# Patient Record
Sex: FEMALE | Race: WHITE | Employment: OTHER | URBAN - METROPOLITAN AREA
[De-identification: names, ages, dates, MRNs, and addresses within clinical notes are randomized per-mention and may not be internally consistent; named-entity substitution may affect disease eponyms.]

---

## 2017-12-27 ENCOUNTER — HOSPITAL ENCOUNTER (OUTPATIENT)
Dept: GENERAL RADIOLOGY | Age: 77
Discharge: HOME | End: 2017-12-27
Attending: PSYCHIATRY & NEUROLOGY | Admitting: PSYCHIATRY & NEUROLOGY

## 2017-12-27 LAB
ANTI-THYROGLOB ABS: <10 IU/ML
HOMOCYSTEINE: 10 UMOL/L (ref 0–10)

## 2017-12-28 LAB — THYROID PEROXIDASE (TPO) ABS: 11 IU/ML

## 2018-05-07 RX ORDER — DONEPEZIL HYDROCHLORIDE 10 MG/1
TABLET, FILM COATED ORAL
Qty: 30 TABLET | Refills: 5 | OUTPATIENT
Start: 2018-05-07

## 2019-04-01 ENCOUNTER — PROCEDURE VISIT (OUTPATIENT)
Dept: SURGERY | Age: 79
End: 2019-04-01
Payer: COMMERCIAL

## 2019-04-01 VITALS
WEIGHT: 147 LBS | OXYGEN SATURATION: 98 % | DIASTOLIC BLOOD PRESSURE: 46 MMHG | HEART RATE: 65 BPM | SYSTOLIC BLOOD PRESSURE: 86 MMHG | TEMPERATURE: 97.4 F

## 2019-04-01 DIAGNOSIS — C44.729 SQUAMOUS CELL CARCINOMA OF LEFT LOWER LEG: Primary | ICD-10-CM

## 2019-04-01 PROCEDURE — 17313 MOHS 1 STAGE T/A/L: CPT | Performed by: DERMATOLOGY

## 2019-04-01 PROCEDURE — 12032 INTMD RPR S/A/T/EXT 2.6-7.5: CPT | Performed by: DERMATOLOGY

## 2019-04-01 RX ORDER — LEVOTHYROXINE SODIUM 0.03 MG/1
50 TABLET ORAL DAILY
Refills: 4 | COMMUNITY
Start: 2019-03-09

## 2019-04-01 RX ORDER — MONTELUKAST SODIUM 10 MG/1
TABLET ORAL
Refills: 1 | COMMUNITY
Start: 2019-02-20

## 2019-04-01 RX ORDER — POLYETHYLENE GLYCOL 3350 17 G/17G
17 POWDER, FOR SOLUTION ORAL
COMMUNITY
Start: 2013-10-13 | End: 2020-05-10

## 2019-04-01 RX ORDER — ALBUTEROL SULFATE 90 UG/1
AEROSOL, METERED RESPIRATORY (INHALATION)
COMMUNITY
Start: 2013-07-09 | End: 2020-05-10

## 2019-04-01 RX ORDER — OMEPRAZOLE 20 MG/1
20 CAPSULE, DELAYED RELEASE ORAL
Status: ON HOLD | COMMUNITY
End: 2022-07-27 | Stop reason: HOSPADM

## 2019-04-01 RX ORDER — ALENDRONATE SODIUM 35 MG/1
TABLET ORAL
Refills: 5 | COMMUNITY
Start: 2019-03-22

## 2019-04-01 RX ORDER — ALBUTEROL SULFATE 90 UG/1
2 AEROSOL, METERED RESPIRATORY (INHALATION)
COMMUNITY
End: 2020-05-10

## 2019-04-01 RX ORDER — CYCLOSPORINE 0.5 MG/ML
EMULSION OPHTHALMIC
Refills: 0 | Status: ON HOLD | COMMUNITY
Start: 2018-12-27 | End: 2022-07-27 | Stop reason: HOSPADM

## 2019-04-01 RX ORDER — DONEPEZIL HYDROCHLORIDE 10 MG/1
TABLET, FILM COATED ORAL
COMMUNITY
Start: 2019-03-30 | End: 2020-05-10

## 2019-04-01 RX ORDER — ACETAMINOPHEN 160 MG
TABLET,DISINTEGRATING ORAL
COMMUNITY
End: 2020-05-10 | Stop reason: ALTCHOICE

## 2019-04-01 RX ORDER — TIMOLOL MALEATE 5 MG/ML
SOLUTION/ DROPS OPHTHALMIC
Refills: 5 | COMMUNITY
Start: 2019-03-24 | End: 2020-05-10

## 2019-04-01 NOTE — PATIENT INSTRUCTIONS
[facility] Office Hours:    Monday-Thursday  7:30 AM-4:30 PM    Friday  9:00 AM-3:00 PM          POST-OPERATIVE CARE FOR STICHES              Bandage change after 48 hours    CARING FOR YOUR SURGICAL SITE  The bandage should remain on and completley dry for 48 hours. Do NOT get the bandage wet. 1. After the first 48 hours, gently remove the remaining part of the bandage. It can be helpful to moisten the bandage edges in the shower. Steri strips may still be on the wound. It is ok, they will fall off slowly with the daily bandage changes. 2. Gently clean the wound daily with mild soap and water. Try to clean off crust and debris. 3. Dry (pat) the area with a clean Q-tip or gauze. 4. Apply a layer of Vaseline/ Aquaphor (or Bacitracin if your doctor recommends) to the wound area only. 5. Cut a piece of Telfa (or any non-stick dressing) to fit just over the wound and secure it with paper tape. If the wound is small you may use a Band- Aid. Keep area covered for a total of 2 week(s). If the dressing comes off or if you have questions, or concerns about the dressing, please call the office for instructions! POST OPERATIVE INSTRUCTIONS    1. Activity: Do not lift anything heavier than a gallon of milk for 1 week. Also, avoid strenuous activity such as running, power walking or contact sports. 2. Eating and drinking: Do not drink alcohol for 48 hours after your procedure. Alcohol increases the chances of bleeding. 3. Medicines   -If you have discomfort, take Acetaminophen (Tylenol or Extra Strength Tylenol). Follow the instructions and warning on the bottle. -If your doctor has prescribed you an Aspirin daily, please keep taking it. Do not take extra Aspirin or medicines containing Aspirin (such as [place] and Excedrin) for 48 hours  after your procedure. Bleeding: If bleeding occurs, DO NOT remove the bandage.  Put firm pressure on the area with gauze for 20 minutes without peeking. If the bleeding continue, apply pressure for another 20 minutes. If the bleeding does not stop after you apply pressure, call us right away. If you can not call, go to the nearest emergency room or urgent care facility. What to expect:  You may have these symptoms. They are normal and should get better with time:  1. Swelling. Swelling usually increases for the first 48 hours after your procedure and then begins to improve. Some soreness and redness around your wound. If we worked close to you eyes  (forehead, nose, temple, or upper cheeks) your eyes may become swollen and/ or black and blue. 2. Bruising, which could last 1 week or more. 3. Pink and bumpy appearance to the scar. This may happen a few weeks after your procedure. After 4 weeks, you may gently massage the area each day with facial moisturizer or petroleum jelly (Vaseline or Aquaphor). This will help to smooth the skin and improve the appearance of the scar. The color of your scar will fade over time, but may be pink for several months after the procedure. The scar may take 6 months to 1 year to reach its final color and appearance. 4. \"Spitting\" suture. Occasionally, an inside suture (stitch) does not completely dissolve. When this happens, (generally 4-8 weeks after surgery), it causes a bump or \"pimple\" to form on the scar. This is easily removed and is not at all serious. It does not mean the skin cancer has returned. Contact us if it happens, but do not be alarmed. Vitamin E oil is NOT necessary. A good moisturizer is just as effective. Sunscreen IS necessary.  Use at least and SPF 30 sunscreen daily- even in winter    Call us at [phone] right away if you have any of the following symptoms:  -Bleeding that you can not stop (see highlighted area above)   -Pain that lasts longer than 48 hours  -Your wound becomes  more painful, red or hot  -Bruising and swelling that does not begin to improve within the 48 hours or gets

## 2019-04-01 NOTE — PROGRESS NOTES
MOHS PROCEDURE NOTE    PHYSICIAN:  Kathy Cano. Shorty Chávez MD    ASSISTANT: Tala De Oliveira RN and Yasir Ball RN     REFERRING PROVIDER:  Demetrice Agrawal MD    PREOPERATIVE DIAGNOSIS: Superficial squamous cell carcinoma     SPECIFIC MOHS INDICATIONS:  location    AUC SCORIN/9    POSTOPERATIVE DIAGNOSIS: SAME    LOCATION: Left shin    OPERATIVE PROCEDURE:  MOHS MICROGRAPHIC SURGERY    RECONSTRUCTION OF DEFECT: Intermediate layered closure    PREOPERATIVE SIZE: 10x7 MM    DEFECT SIZE: 17x14 MM    LENGTH OF REPAIRED WOUND/SIZE OF FLAP/SIZE OF GRAFT:  26 MM    ANESTHESIA:  8 mL 1% lidocaine with epinephrine 1:100,000 buffered. EBL:  MINIMAL    DURATION OF PROCEDURE:  1 HOUR    POSTOPERATIVE OBSERVATION: 0.75 HOUR    SPECIMENS:  SEE MOHS MAP    COMPLICATIONS:  NONE    DESCRIPTION OF PROCEDURE:  The patient was given a mirror, as appropriate, and the biopsy site was identified, marked with a surgical marking pen, and verified by the patient. Options for treatment were discussed and the patient was informed that Mohs surgery was the selected treatment based on its lower recurrence rate, given the features listed above, as compared to other treatment modalities such as excision, radiation, or curettage, and agreed with this treatment plan. Risks and benefits including bruising, swelling, bleeding, infection, nerve injury, recurrence, and scarring were discussed with the patient prior to the procedure and a written consent detailing these and other risks was reviewed with the patient and signed. There was a time out for person and procedure verification. The surgical site was prepped with an antiseptic solution. Application of an antiseptic solution was repeated before each surgical stage. Stage I:  The clinically-apparent tumor was carefully defined and debulked, determining the edge of the surgical excision.     A thin layer of tumor-laden tissue was excised with a narrow margin of normal-appearing skin, using the technique of Mohs. A map was prepared to correspond to the area of skin from which it was excised. Hemostasis was achieved using electrosurgery. The wound was bandaged. The tissue was prepared for the cryostat and sectioned. 1 section(s) prepared. Each section was coded, cut, and stained for microscopic examination. The entire base and margins of the excised piece of tissue were examined by the surgeon. No tumor was identified at the peripheral margins of stage I of microscopically controlled surgery. DEFECT MANAGEMENT:    REPAIR DESCRIPTION:  Various closure modalities were discussed with the patient, and it was decided that an intermediate layered repair would best preserve normal anatomic and functional relationships. Additional risk of wound dehiscence was discussed. The area was anesthetized with 1% lidocaine with epinephrine 1:100,000 buffered, was given a sterile prep using Chlorhexidine gluconate 2% with isopropylalcohol 70% and draped in the usual sterile fashion. Recreation and enlargement of the wound was performed by excising cones of tissue via the triangulation technique. The final incision lines were placed with respect for the patient's natural skin tension lines in a linear configuration to avoid functional and aesthetic distortion of adjacent free margins. Following minimal undermining, meticulous hemostasis was obtained with spot monopolar electrocoagulation. Subcutaneous dead space and dermis were closed using 3-0 Vicryl buried subcutaneous interrupted suture and the epidermis was approximated with 4-0 Prolene interrupted mattress stitches. WOUND COVERAGE:  The wound was cleaned with normal saline solution, dried off, Aquaphor ointment was applied, and the wound was covered. A dressing was applied for stabilization and light pressure. The patient was given detailed oral and written instructions on postoperative care. There were no complications.   The patient left the Unit in good medical condition. FOLLOW-UP:  The patient will return for suture removal in 14 days.

## 2019-04-02 ENCOUNTER — TELEPHONE (OUTPATIENT)
Dept: SURGERY | Age: 79
End: 2019-04-02

## 2019-04-02 DIAGNOSIS — Z48.02 VISIT FOR SUTURE REMOVAL: Primary | ICD-10-CM

## 2019-04-02 NOTE — TELEPHONE ENCOUNTER
The patient was in the office on 4/1/19 for Mohs located on the left shin with ILC repair. The patient tolerated the procedure well and left the office in good condition. A post-operative telephone call was placed at 2:40 pm in order to check on the patient's recovery process. The patient reported doing well and had no complaints. All of the patient's questions were answered.

## 2019-04-15 ENCOUNTER — NURSE ONLY (OUTPATIENT)
Dept: SURGERY | Age: 79
End: 2019-04-15

## 2019-04-15 DIAGNOSIS — Z48.02 VISIT FOR SUTURE REMOVAL: Primary | ICD-10-CM

## 2019-04-15 NOTE — PROGRESS NOTES
S:  The patient is here for suture removal s/p Mohs surgery on the left shin and Intermediate layered closure repair, 2 week(s) ago. The site appears well-healed without signs of infection (redness, pain or discharge). The sutures were removed. Daily Wound care and activity instructions given. The patient was scheduled for follow-up prn for scar/wound check. The patient was scheduled for f/u with General Dermatology per their instructions.

## 2019-04-15 NOTE — PATIENT INSTRUCTIONS
[facility] Office Hours:    Monday-Thursday  7:30 AM-4:30 PM    Friday  9:00 AM-3:00 PM    WOUND CARE AFTER SUTURE REMOVAL    After your stiches have been removed, your scar is still very fragile. In fact, scars continue to change and evolve, what we call remodel, for about a year after your procedure. Follow the following steps below to ensure that your scar heals well. Instructions    1. If Steri-strips were applied, keep them on until they fall off on their own. 2. Protect your scar from the sun. Use a sunscreen or bandage to cover your scar. Cleave Sulphur exposure can cause your scar to become discolored and appear red or brown. 3. To help soften your scar more rapidly, it is helpful, but not necessary, for you to   massage the scar gently each night for twenty minutes. 4. Spitting suture. Occasionally, an inside suture (stitch) does not completely dissolve. When this happens, (generally 4-8 weeks after surgery), it causes a bump or pimple to form on the scar. This is easily removed and is not at all serious. It   does not mean the skin cancer has returned. Contact us if it happens, but do not be alarmed. 5. If you scar becomes tender, itchy or becomes very large, let Dr. Alfredo Kwan know. There    are treatments that can improve the appearance of your scar or help make it more comfortable.

## 2020-05-10 ENCOUNTER — HOSPITAL ENCOUNTER (EMERGENCY)
Age: 80
Discharge: HOME | End: 2020-05-11
Attending: EMERGENCY MEDICINE
Payer: MEDICARE

## 2020-05-10 ENCOUNTER — APPOINTMENT (OUTPATIENT)
Dept: GENERAL RADIOLOGY | Age: 80
End: 2020-05-10
Payer: MEDICARE

## 2020-05-10 LAB
ANION GAP SERPL CALCULATED.3IONS-SCNC: 11 MMOL/L (ref 3–16)
BASOPHILS ABSOLUTE: 0.1 K/UL (ref 0–0.2)
BASOPHILS RELATIVE PERCENT: 0.7 %
BUN BLDV-MCNC: 12 MG/DL (ref 7–20)
CALCIUM SERPL-MCNC: 9.5 MG/DL (ref 8.3–10.6)
CHLORIDE BLD-SCNC: 101 MMOL/L (ref 99–110)
CO2: 24 MMOL/L (ref 21–32)
CREAT SERPL-MCNC: <0.5 MG/DL (ref 0.6–1.2)
EOSINOPHILS ABSOLUTE: 0.2 K/UL (ref 0–0.6)
EOSINOPHILS RELATIVE PERCENT: 2.1 %
GFR AFRICAN AMERICAN: >60
GFR NON-AFRICAN AMERICAN: >60
GLUCOSE BLD-MCNC: 119 MG/DL (ref 70–99)
HCT VFR BLD CALC: 41.2 % (ref 36–48)
HEMOGLOBIN: 14.1 G/DL (ref 12–16)
LYMPHOCYTES ABSOLUTE: 2.9 K/UL (ref 1–5.1)
LYMPHOCYTES RELATIVE PERCENT: 30.9 %
MCH RBC QN AUTO: 31.8 PG (ref 26–34)
MCHC RBC AUTO-ENTMCNC: 34.2 G/DL (ref 31–36)
MCV RBC AUTO: 93.1 FL (ref 80–100)
MONOCYTES ABSOLUTE: 1.1 K/UL (ref 0–1.3)
MONOCYTES RELATIVE PERCENT: 11.6 %
NEUTROPHILS ABSOLUTE: 5.1 K/UL (ref 1.7–7.7)
NEUTROPHILS RELATIVE PERCENT: 54.7 %
PDW BLD-RTO: 13.6 % (ref 12.4–15.4)
PLATELET # BLD: 227 K/UL (ref 135–450)
PMV BLD AUTO: 9.1 FL (ref 5–10.5)
POTASSIUM REFLEX MAGNESIUM: 5 MMOL/L (ref 3.5–5.1)
PRO-BNP: 223 PG/ML (ref 0–449)
RBC # BLD: 4.42 M/UL (ref 4–5.2)
REASON FOR REJECTION: NORMAL
REJECTED TEST: NORMAL
SODIUM BLD-SCNC: 136 MMOL/L (ref 136–145)
TROPONIN: <0.01 NG/ML
WBC # BLD: 9.2 K/UL (ref 4–11)

## 2020-05-10 PROCEDURE — 93005 ELECTROCARDIOGRAM TRACING: CPT | Performed by: EMERGENCY MEDICINE

## 2020-05-10 PROCEDURE — 84484 ASSAY OF TROPONIN QUANT: CPT

## 2020-05-10 PROCEDURE — 99285 EMERGENCY DEPT VISIT HI MDM: CPT

## 2020-05-10 PROCEDURE — 83880 ASSAY OF NATRIURETIC PEPTIDE: CPT

## 2020-05-10 PROCEDURE — 80048 BASIC METABOLIC PNL TOTAL CA: CPT

## 2020-05-10 PROCEDURE — 71045 X-RAY EXAM CHEST 1 VIEW: CPT

## 2020-05-10 PROCEDURE — 85025 COMPLETE CBC W/AUTO DIFF WBC: CPT

## 2020-05-10 ASSESSMENT — PAIN DESCRIPTION - PAIN TYPE: TYPE: CHRONIC PAIN

## 2020-05-10 ASSESSMENT — PAIN DESCRIPTION - LOCATION: LOCATION: NECK

## 2020-05-10 ASSESSMENT — PAIN SCALES - GENERAL: PAINLEVEL_OUTOF10: 8

## 2020-05-11 ENCOUNTER — APPOINTMENT (OUTPATIENT)
Dept: CT IMAGING | Age: 80
End: 2020-05-11
Payer: MEDICARE

## 2020-05-11 VITALS
HEIGHT: 66 IN | BODY MASS INDEX: 23.3 KG/M2 | SYSTOLIC BLOOD PRESSURE: 146 MMHG | HEART RATE: 62 BPM | WEIGHT: 145 LBS | OXYGEN SATURATION: 98 % | TEMPERATURE: 98 F | DIASTOLIC BLOOD PRESSURE: 76 MMHG | RESPIRATION RATE: 16 BRPM

## 2020-05-11 LAB
D DIMER: 249 NG/ML DDU (ref 0–229)
EKG ATRIAL RATE: 61 BPM
EKG DIAGNOSIS: NORMAL
EKG P AXIS: 81 DEGREES
EKG P-R INTERVAL: 134 MS
EKG Q-T INTERVAL: 412 MS
EKG QRS DURATION: 78 MS
EKG QTC CALCULATION (BAZETT): 414 MS
EKG R AXIS: -14 DEGREES
EKG T AXIS: 47 DEGREES
EKG VENTRICULAR RATE: 61 BPM

## 2020-05-11 PROCEDURE — 70498 CT ANGIOGRAPHY NECK: CPT

## 2020-05-11 PROCEDURE — 85379 FIBRIN DEGRADATION QUANT: CPT

## 2020-05-11 PROCEDURE — 93010 ELECTROCARDIOGRAM REPORT: CPT | Performed by: INTERNAL MEDICINE

## 2020-05-11 PROCEDURE — 71275 CT ANGIOGRAPHY CHEST: CPT

## 2020-05-11 PROCEDURE — 6360000004 HC RX CONTRAST MEDICATION: Performed by: EMERGENCY MEDICINE

## 2020-05-11 PROCEDURE — 36415 COLL VENOUS BLD VENIPUNCTURE: CPT

## 2020-05-11 RX ADMIN — IOPAMIDOL 75 ML: 755 INJECTION, SOLUTION INTRAVENOUS at 02:07

## 2020-05-11 RX ADMIN — IOPAMIDOL 75 ML: 755 INJECTION, SOLUTION INTRAVENOUS at 02:16

## 2020-05-11 ASSESSMENT — PAIN SCALES - GENERAL: PAINLEVEL_OUTOF10: 2

## 2020-05-11 ASSESSMENT — HEART SCORE: ECG: 0

## 2020-05-11 NOTE — ED PROVIDER NOTES
EVERY DAY    OMEPRAZOLE (PRILOSEC) 20 MG DELAYED RELEASE CAPSULE    Take 20 mg by mouth    RESTASIS 0.05 % OPHTHALMIC EMULSION    INSTILL 1 DROP INTO BOTH EYES TWICE A DAY       Social history:  reports that she has never smoked. She has never used smokeless tobacco.    Family history:  History reviewed. No pertinent family history. Exam  ED Triage Vitals   BP Temp Temp Source Pulse Resp SpO2 Height Weight   05/10/20 2304 05/10/20 2304 05/10/20 2309 05/10/20 2304 05/10/20 2304 05/10/20 2304 05/10/20 2304 05/10/20 2304   (!) 164/72 97.6 °F (36.4 °C) Oral 71 16 99 % 5' 6\" (1.676 m) 145 lb (65.8 kg)     Nursing note and vitals reviewed. Constitutional: In no acute distress  HENT:      Head: Normocephalic      Ears: External ears normal.      Nose: Nose normal.     Mouth: Membrane mucosa moist   Eyes: No discharge. Neck: Supple. Trachea midline. Cardiovascular: Regular rate. Warm extremities  Pulmonary/Chest: Effort normal. No respiratory distress. No wheezes. Speaking full sentences. Abdominal: Soft. No distension. Nontender  : Deferred  Rectal: Deferred   Musculoskeletal: Moves all extremities. No gross deformity. Neurological: Alert and oriented. Face symmetric. Speech is clear. CN 2-12 intact  Skin: Warm and dry. No rash. Psychiatric: Normal mood and affect. Behavior is normal.    Procedures      EKG  The Ekg interpreted by me in the absence of a cardiologist shows. Normal sinus rhythm. No specific ST-T wave changes appreciated. No evidence of acute ischemia. Radiology  CTA CHEST W CONTRAST   Final Result   1. Unremarkable CT angiogram of the neck and chest.  No evidence of aortic or   other arterial dissection. 2. Mild cardiomegaly. 3. Mild biapical lung scarring. CTA NECK W CONTRAST   Final Result   1. Unremarkable CT angiogram of the neck and chest.  No evidence of aortic or   other arterial dissection. 2. Mild cardiomegaly. 3. Mild biapical lung scarring.          XR PACKET    Take 17 g by mouth    TIMOLOL (TIMOPTIC) 0.5 % OPHTHALMIC SOLUTION    INSTILL 1 DROP INTO BOTH EYES TWICE A DAY       This chart was generated using the Dragon dictation system. I created this record but it may contain dictation errors given the limitations of this technology.         Jocelin Ojeda MD  05/11/20 0162

## 2020-05-12 ENCOUNTER — CARE COORDINATION (OUTPATIENT)
Dept: CARE COORDINATION | Age: 80
End: 2020-05-12

## 2020-05-12 NOTE — CARE COORDINATION
Patient contacted regarding recent visit for viral symptoms. This Chiquita Skelton contacted the patient by telephone to perform post discharge call. Left message on machine for a return call regarding ED visit 5/10. If no return call, will attempt to reach pt again.     Aide Campoverde  [phone]

## 2021-06-02 ENCOUNTER — HOSPITAL ENCOUNTER (OUTPATIENT)
Dept: CARDIAC REHAB | Age: 81
Setting detail: THERAPIES SERIES
Discharge: HOME | End: 2021-06-02
Payer: MEDICARE

## 2021-06-07 ENCOUNTER — HOSPITAL ENCOUNTER (OUTPATIENT)
Dept: CARDIAC REHAB | Age: 81
Setting detail: THERAPIES SERIES
Discharge: HOME | End: 2021-06-07
Payer: MEDICARE

## 2021-06-07 PROCEDURE — 93798 PHYS/QHP OP CAR RHAB W/ECG: CPT

## 2021-06-09 ENCOUNTER — HOSPITAL ENCOUNTER (OUTPATIENT)
Dept: CARDIAC REHAB | Age: 81
Setting detail: THERAPIES SERIES
Discharge: HOME | End: 2021-06-09
Payer: MEDICARE

## 2021-06-09 PROCEDURE — 93798 PHYS/QHP OP CAR RHAB W/ECG: CPT

## 2021-06-11 ENCOUNTER — HOSPITAL ENCOUNTER (OUTPATIENT)
Dept: CARDIAC REHAB | Age: 81
Setting detail: THERAPIES SERIES
Discharge: HOME | End: 2021-06-11
Payer: MEDICARE

## 2021-06-11 PROCEDURE — 93798 PHYS/QHP OP CAR RHAB W/ECG: CPT

## 2021-06-14 ENCOUNTER — HOSPITAL ENCOUNTER (OUTPATIENT)
Dept: CARDIAC REHAB | Age: 81
Setting detail: THERAPIES SERIES
Discharge: HOME | End: 2021-06-14
Payer: MEDICARE

## 2021-06-14 PROCEDURE — 93798 PHYS/QHP OP CAR RHAB W/ECG: CPT

## 2021-06-16 ENCOUNTER — HOSPITAL ENCOUNTER (OUTPATIENT)
Dept: CARDIAC REHAB | Age: 81
Setting detail: THERAPIES SERIES
Discharge: HOME | End: 2021-06-16
Payer: MEDICARE

## 2021-06-16 PROCEDURE — 93798 PHYS/QHP OP CAR RHAB W/ECG: CPT

## 2021-06-18 ENCOUNTER — HOSPITAL ENCOUNTER (OUTPATIENT)
Dept: CARDIAC REHAB | Age: 81
Setting detail: THERAPIES SERIES
Discharge: HOME | End: 2021-06-18
Payer: MEDICARE

## 2021-06-18 PROCEDURE — 93798 PHYS/QHP OP CAR RHAB W/ECG: CPT

## 2021-06-21 ENCOUNTER — HOSPITAL ENCOUNTER (OUTPATIENT)
Dept: CARDIAC REHAB | Age: 81
Setting detail: THERAPIES SERIES
Discharge: HOME | End: 2021-06-21
Payer: MEDICARE

## 2021-06-21 PROCEDURE — 93798 PHYS/QHP OP CAR RHAB W/ECG: CPT

## 2021-06-23 ENCOUNTER — HOSPITAL ENCOUNTER (OUTPATIENT)
Dept: CARDIAC REHAB | Age: 81
Setting detail: THERAPIES SERIES
Discharge: HOME | End: 2021-06-23
Payer: MEDICARE

## 2021-06-23 PROCEDURE — 93798 PHYS/QHP OP CAR RHAB W/ECG: CPT

## 2021-06-25 ENCOUNTER — HOSPITAL ENCOUNTER (OUTPATIENT)
Dept: CARDIAC REHAB | Age: 81
Setting detail: THERAPIES SERIES
Discharge: HOME | End: 2021-06-25
Payer: MEDICARE

## 2021-06-25 PROCEDURE — 93798 PHYS/QHP OP CAR RHAB W/ECG: CPT

## 2021-06-28 ENCOUNTER — HOSPITAL ENCOUNTER (OUTPATIENT)
Dept: CARDIAC REHAB | Age: 81
Setting detail: THERAPIES SERIES
Discharge: HOME | End: 2021-06-28
Payer: MEDICARE

## 2021-06-28 PROCEDURE — 93798 PHYS/QHP OP CAR RHAB W/ECG: CPT

## 2021-06-30 ENCOUNTER — HOSPITAL ENCOUNTER (OUTPATIENT)
Dept: CARDIAC REHAB | Age: 81
Setting detail: THERAPIES SERIES
Discharge: HOME | End: 2021-06-30
Payer: MEDICARE

## 2021-06-30 PROCEDURE — 93798 PHYS/QHP OP CAR RHAB W/ECG: CPT

## 2021-07-02 ENCOUNTER — HOSPITAL ENCOUNTER (OUTPATIENT)
Dept: CARDIAC REHAB | Age: 81
Setting detail: THERAPIES SERIES
Discharge: HOME | End: 2021-07-02
Payer: MEDICARE

## 2021-07-02 PROCEDURE — 93798 PHYS/QHP OP CAR RHAB W/ECG: CPT

## 2021-07-07 ENCOUNTER — HOSPITAL ENCOUNTER (OUTPATIENT)
Dept: CARDIAC REHAB | Age: 81
Setting detail: THERAPIES SERIES
Discharge: HOME | End: 2021-07-07
Payer: MEDICARE

## 2021-07-07 PROCEDURE — 93798 PHYS/QHP OP CAR RHAB W/ECG: CPT

## 2021-07-09 ENCOUNTER — HOSPITAL ENCOUNTER (OUTPATIENT)
Dept: CARDIAC REHAB | Age: 81
Setting detail: THERAPIES SERIES
Discharge: HOME | End: 2021-07-09
Payer: MEDICARE

## 2021-07-09 PROCEDURE — 93798 PHYS/QHP OP CAR RHAB W/ECG: CPT

## 2021-07-12 ENCOUNTER — HOSPITAL ENCOUNTER (OUTPATIENT)
Dept: CARDIAC REHAB | Age: 81
Setting detail: THERAPIES SERIES
Discharge: HOME | End: 2021-07-12
Payer: MEDICARE

## 2021-07-12 PROCEDURE — 93798 PHYS/QHP OP CAR RHAB W/ECG: CPT

## 2021-07-14 ENCOUNTER — HOSPITAL ENCOUNTER (OUTPATIENT)
Dept: CARDIAC REHAB | Age: 81
Setting detail: THERAPIES SERIES
Discharge: HOME | End: 2021-07-14
Payer: MEDICARE

## 2021-07-14 PROCEDURE — 93798 PHYS/QHP OP CAR RHAB W/ECG: CPT

## 2021-07-16 ENCOUNTER — HOSPITAL ENCOUNTER (OUTPATIENT)
Dept: CARDIAC REHAB | Age: 81
Setting detail: THERAPIES SERIES
Discharge: HOME | End: 2021-07-16
Payer: MEDICARE

## 2021-07-16 PROCEDURE — 93798 PHYS/QHP OP CAR RHAB W/ECG: CPT

## 2021-07-19 ENCOUNTER — HOSPITAL ENCOUNTER (OUTPATIENT)
Dept: CARDIAC REHAB | Age: 81
Setting detail: THERAPIES SERIES
Discharge: HOME | End: 2021-07-19
Payer: MEDICARE

## 2021-07-19 PROCEDURE — 93798 PHYS/QHP OP CAR RHAB W/ECG: CPT

## 2021-07-21 ENCOUNTER — HOSPITAL ENCOUNTER (OUTPATIENT)
Dept: CARDIAC REHAB | Age: 81
Setting detail: THERAPIES SERIES
Discharge: HOME | End: 2021-07-21
Payer: MEDICARE

## 2021-07-21 PROCEDURE — 93798 PHYS/QHP OP CAR RHAB W/ECG: CPT

## 2021-07-26 ENCOUNTER — HOSPITAL ENCOUNTER (OUTPATIENT)
Dept: CARDIAC REHAB | Age: 81
Setting detail: THERAPIES SERIES
Discharge: HOME | End: 2021-07-26
Payer: MEDICARE

## 2021-07-26 PROCEDURE — 93798 PHYS/QHP OP CAR RHAB W/ECG: CPT

## 2021-07-28 ENCOUNTER — HOSPITAL ENCOUNTER (OUTPATIENT)
Dept: CARDIAC REHAB | Age: 81
Setting detail: THERAPIES SERIES
Discharge: HOME | End: 2021-07-28
Payer: MEDICARE

## 2021-07-28 PROCEDURE — 93798 PHYS/QHP OP CAR RHAB W/ECG: CPT

## 2021-07-30 ENCOUNTER — HOSPITAL ENCOUNTER (OUTPATIENT)
Dept: CARDIAC REHAB | Age: 81
Setting detail: THERAPIES SERIES
Discharge: HOME | End: 2021-07-30
Payer: MEDICARE

## 2021-07-30 PROCEDURE — 93798 PHYS/QHP OP CAR RHAB W/ECG: CPT

## 2021-08-02 ENCOUNTER — APPOINTMENT (OUTPATIENT)
Dept: CARDIAC REHAB | Age: 81
End: 2021-08-02
Payer: MEDICARE

## 2021-08-04 ENCOUNTER — APPOINTMENT (OUTPATIENT)
Dept: CARDIAC REHAB | Age: 81
End: 2021-08-04
Payer: MEDICARE

## 2021-08-06 ENCOUNTER — APPOINTMENT (OUTPATIENT)
Dept: CARDIAC REHAB | Age: 81
End: 2021-08-06
Payer: MEDICARE

## 2021-08-09 ENCOUNTER — HOSPITAL ENCOUNTER (OUTPATIENT)
Dept: CARDIAC REHAB | Age: 81
Setting detail: THERAPIES SERIES
Discharge: HOME | End: 2021-08-09
Payer: MEDICARE

## 2021-08-09 PROCEDURE — 93798 PHYS/QHP OP CAR RHAB W/ECG: CPT

## 2021-08-11 ENCOUNTER — HOSPITAL ENCOUNTER (OUTPATIENT)
Dept: CARDIAC REHAB | Age: 81
Setting detail: THERAPIES SERIES
Discharge: HOME | End: 2021-08-11
Payer: MEDICARE

## 2021-08-11 PROCEDURE — 93798 PHYS/QHP OP CAR RHAB W/ECG: CPT

## 2021-08-13 ENCOUNTER — HOSPITAL ENCOUNTER (OUTPATIENT)
Dept: CARDIAC REHAB | Age: 81
Setting detail: THERAPIES SERIES
Discharge: HOME | End: 2021-08-13
Payer: MEDICARE

## 2021-08-13 PROCEDURE — 93798 PHYS/QHP OP CAR RHAB W/ECG: CPT

## 2021-08-16 ENCOUNTER — HOSPITAL ENCOUNTER (OUTPATIENT)
Dept: CARDIAC REHAB | Age: 81
Setting detail: THERAPIES SERIES
Discharge: HOME | End: 2021-08-16
Payer: MEDICARE

## 2021-08-16 PROCEDURE — 93797 PHYS/QHP OP CAR RHAB WO ECG: CPT

## 2021-08-18 ENCOUNTER — HOSPITAL ENCOUNTER (OUTPATIENT)
Dept: CARDIAC REHAB | Age: 81
Setting detail: THERAPIES SERIES
Discharge: HOME | End: 2021-08-18
Payer: MEDICARE

## 2021-08-18 PROCEDURE — 93797 PHYS/QHP OP CAR RHAB WO ECG: CPT

## 2021-08-20 ENCOUNTER — HOSPITAL ENCOUNTER (OUTPATIENT)
Dept: CARDIAC REHAB | Age: 81
Setting detail: THERAPIES SERIES
Discharge: HOME | End: 2021-08-20
Payer: MEDICARE

## 2021-08-20 PROCEDURE — 93797 PHYS/QHP OP CAR RHAB WO ECG: CPT

## 2021-08-23 ENCOUNTER — HOSPITAL ENCOUNTER (OUTPATIENT)
Dept: CARDIAC REHAB | Age: 81
Setting detail: THERAPIES SERIES
Discharge: HOME | End: 2021-08-23
Payer: MEDICARE

## 2021-08-23 PROCEDURE — 93797 PHYS/QHP OP CAR RHAB WO ECG: CPT

## 2021-08-25 ENCOUNTER — HOSPITAL ENCOUNTER (OUTPATIENT)
Dept: CARDIAC REHAB | Age: 81
Setting detail: THERAPIES SERIES
Discharge: HOME | End: 2021-08-25
Payer: MEDICARE

## 2021-08-25 PROCEDURE — 93797 PHYS/QHP OP CAR RHAB WO ECG: CPT

## 2021-08-27 ENCOUNTER — HOSPITAL ENCOUNTER (OUTPATIENT)
Dept: CARDIAC REHAB | Age: 81
Setting detail: THERAPIES SERIES
Discharge: HOME | End: 2021-08-27
Payer: MEDICARE

## 2021-08-27 PROCEDURE — 93797 PHYS/QHP OP CAR RHAB WO ECG: CPT

## 2021-08-30 ENCOUNTER — HOSPITAL ENCOUNTER (OUTPATIENT)
Dept: CARDIAC REHAB | Age: 81
Setting detail: THERAPIES SERIES
Discharge: HOME | End: 2021-08-30
Payer: MEDICARE

## 2021-08-30 PROCEDURE — 93797 PHYS/QHP OP CAR RHAB WO ECG: CPT

## 2021-09-01 ENCOUNTER — HOSPITAL ENCOUNTER (OUTPATIENT)
Dept: CARDIAC REHAB | Age: 81
Setting detail: THERAPIES SERIES
Discharge: HOME | End: 2021-09-01
Payer: MEDICARE

## 2021-09-01 PROCEDURE — 93797 PHYS/QHP OP CAR RHAB WO ECG: CPT

## 2021-09-03 ENCOUNTER — HOSPITAL ENCOUNTER (OUTPATIENT)
Dept: CARDIAC REHAB | Age: 81
Setting detail: THERAPIES SERIES
Discharge: HOME | End: 2021-09-03
Payer: MEDICARE

## 2021-09-03 PROCEDURE — 93797 PHYS/QHP OP CAR RHAB WO ECG: CPT

## 2021-09-08 ENCOUNTER — HOSPITAL ENCOUNTER (OUTPATIENT)
Dept: CARDIAC REHAB | Age: 81
Setting detail: THERAPIES SERIES
Discharge: HOME | End: 2021-09-08
Payer: MEDICARE

## 2021-09-08 PROCEDURE — 93797 PHYS/QHP OP CAR RHAB WO ECG: CPT

## 2021-09-10 ENCOUNTER — HOSPITAL ENCOUNTER (OUTPATIENT)
Dept: CARDIAC REHAB | Age: 81
Setting detail: THERAPIES SERIES
Discharge: HOME | End: 2021-09-10
Payer: MEDICARE

## 2021-09-10 PROCEDURE — 93797 PHYS/QHP OP CAR RHAB WO ECG: CPT

## 2021-09-13 ENCOUNTER — APPOINTMENT (OUTPATIENT)
Dept: CARDIAC REHAB | Age: 81
End: 2021-09-13
Payer: MEDICARE

## 2021-09-15 ENCOUNTER — APPOINTMENT (OUTPATIENT)
Dept: CARDIAC REHAB | Age: 81
End: 2021-09-15
Payer: MEDICARE

## 2021-09-17 ENCOUNTER — APPOINTMENT (OUTPATIENT)
Dept: CARDIAC REHAB | Age: 81
End: 2021-09-17
Payer: MEDICARE

## 2021-09-20 ENCOUNTER — APPOINTMENT (OUTPATIENT)
Dept: CARDIAC REHAB | Age: 81
End: 2021-09-20
Payer: MEDICARE

## 2021-09-22 ENCOUNTER — APPOINTMENT (OUTPATIENT)
Dept: CARDIAC REHAB | Age: 81
End: 2021-09-22
Payer: MEDICARE

## 2021-09-24 ENCOUNTER — APPOINTMENT (OUTPATIENT)
Dept: CARDIAC REHAB | Age: 81
End: 2021-09-24
Payer: MEDICARE

## 2021-09-27 ENCOUNTER — APPOINTMENT (OUTPATIENT)
Dept: CARDIAC REHAB | Age: 81
End: 2021-09-27
Payer: MEDICARE

## 2021-09-29 ENCOUNTER — APPOINTMENT (OUTPATIENT)
Dept: CARDIAC REHAB | Age: 81
End: 2021-09-29
Payer: MEDICARE

## 2022-05-24 ENCOUNTER — APPOINTMENT (OUTPATIENT)
Dept: CT IMAGING | Age: 82
End: 2022-05-24
Payer: MEDICARE

## 2022-05-24 ENCOUNTER — HOSPITAL ENCOUNTER (EMERGENCY)
Age: 82
Discharge: HOME | End: 2022-05-25
Attending: EMERGENCY MEDICINE
Payer: MEDICARE

## 2022-05-24 VITALS
HEART RATE: 54 BPM | BODY MASS INDEX: 24.11 KG/M2 | DIASTOLIC BLOOD PRESSURE: 68 MMHG | SYSTOLIC BLOOD PRESSURE: 152 MMHG | WEIGHT: 150 LBS | RESPIRATION RATE: 16 BRPM | TEMPERATURE: 98 F | OXYGEN SATURATION: 98 % | HEIGHT: 66 IN

## 2022-05-24 DIAGNOSIS — S09.90XA INJURY OF HEAD, INITIAL ENCOUNTER: Primary | ICD-10-CM

## 2022-05-24 DIAGNOSIS — S00.81XA ABRASION OF FACE, INITIAL ENCOUNTER: ICD-10-CM

## 2022-05-24 LAB
BILIRUBIN URINE: NEGATIVE
BLOOD, URINE: NEGATIVE
CLARITY: CLEAR
COLOR: YELLOW
GLUCOSE URINE: NEGATIVE MG/DL
KETONES, URINE: NEGATIVE MG/DL
LEUKOCYTE ESTERASE, URINE: NEGATIVE
MICROSCOPIC EXAMINATION: NORMAL
NITRITE, URINE: NEGATIVE
PH UA: 8 (ref 5–8)
PROTEIN UA: NEGATIVE MG/DL
SPECIFIC GRAVITY UA: 1.01 (ref 1–1.03)
URINE REFLEX TO CULTURE: NORMAL
URINE TYPE: NORMAL
UROBILINOGEN, URINE: 0.2 E.U./DL

## 2022-05-24 PROCEDURE — 99284 EMERGENCY DEPT VISIT MOD MDM: CPT

## 2022-05-24 PROCEDURE — 72125 CT NECK SPINE W/O DYE: CPT

## 2022-05-24 PROCEDURE — 70486 CT MAXILLOFACIAL W/O DYE: CPT

## 2022-05-24 PROCEDURE — 70450 CT HEAD/BRAIN W/O DYE: CPT

## 2022-05-24 PROCEDURE — 81003 URINALYSIS AUTO W/O SCOPE: CPT

## 2022-05-24 PROCEDURE — 90715 TDAP VACCINE 7 YRS/> IM: CPT | Performed by: EMERGENCY MEDICINE

## 2022-05-24 PROCEDURE — 90471 IMMUNIZATION ADMIN: CPT | Performed by: EMERGENCY MEDICINE

## 2022-05-24 PROCEDURE — 6360000002 HC RX W HCPCS: Performed by: EMERGENCY MEDICINE

## 2022-05-24 RX ADMIN — TETANUS TOXOID, REDUCED DIPHTHERIA TOXOID AND ACELLULAR PERTUSSIS VACCINE, ADSORBED 0.5 ML: 5; 2.5; 8; 8; 2.5 SUSPENSION INTRAMUSCULAR at 22:39

## 2022-05-24 ASSESSMENT — PAIN - FUNCTIONAL ASSESSMENT: PAIN_FUNCTIONAL_ASSESSMENT: NONE - DENIES PAIN

## 2022-05-25 NOTE — ED NOTES
Pt abrasion to nose applied with PSO/Band-aid.      Alexandra Manriquez LPN  92/57/97 1412 Left arm;

## 2022-05-25 NOTE — ED PROVIDER NOTES
[address] [facility]  ED  EMERGENCY DEPARTMENTENCOUNTER      Pt Name: Rosie Bradford  MRN: [id number]  [place] 1940  Date ofevaluation: 5/24/2022  Provider: Rosa Crawley MD    CHIEF COMPLAINT       Chief Complaint   Patient presents with   Serrano Fall     out taking a walk and a dog ran out in front of her and she tripped and fell striking her face onthe concrete, denies LOC         HISTORY OF PRESENT ILLNESS   (Location/Symptom, Timing/Onset,Context/Setting, Quality, Duration, Modifying Factors, Severity)  Note limiting factors. Rosie Bradford is a 79 y.o. female  who  has a past medical history of Cerebrovascular disease. who presents to the emergency department for evaluation of after a fall. Patient reports that the patient was at a dog park and she was knocked over by a dog causing her to fall on her face. Patient noted to have abrasions to the bridge of the nose and left upper forehead. There is no report of loss of consciousness. He has no complaints of neck pain numbness weakness or changes in vision. Denies nausea or vomiting. States she was able to ambulate after the incident. Patient does have a history of dementia per . He states that she appears to be mentating at baseline. Patient did not taking medicines for her symptoms. HPI    NursingNotes were reviewed. REVIEW OF SYSTEMS    (2-9 systems for level 4, 10 or more for level 5)     Review of Systems   Unable to perform ROS: Dementia       Except as noted above the remainder of the review of systems was reviewed and negative.        PAST MEDICAL HISTORY     Past Medical History:   Diagnosis Date    Cerebrovascular disease     squamous cell carcinoma         SURGICALHISTORY       Past Surgical History:   Procedure Laterality Date    APPENDECTOMY      CHOLECYSTECTOMY      MOHS SURGERY  04/01/2019         CURRENT MEDICATIONS       Discharge Medication List as of 5/24/2022 11:51 PM      CONTINUE these medications which have NOT CHANGED    Details   montelukast (SINGULAIR) 10 MG tablet TAKE 1 TABLET BY MOUTH EVERY DAY, R-1Historical Med      alendronate (FOSAMAX) 35 MG tablet TAKE 1 TAB BY MOUTH EVERY 7 DAYS (ONCE WEEKLY). , R-5Historical Med      levothyroxine (SYNTHROID) 25 MCG tablet TAKE 1 TABLET BY MOUTH EVERY MORNING ON EMPTY STOMACH AND SEPERATE AT LEAST 1 HOUR BEFORE ANY OTHER, R-4Historical Med      omeprazole (PRILOSEC) 20 MG delayed release capsule Take 20 mg by mouthHistorical Med      RESTASIS 0.05 % ophthalmic emulsion INSTILL 1 DROP INTO BOTH EYES TWICE A DAY, R-0, DAWHistorical Med                  Egg white [albumen, egg]    FAMILY HISTORY     History reviewed. No pertinent family history. SOCIAL HISTORY       Social History     Socioeconomic History    Marital status:      Spouse name: None    Number of children: None    Years of education: None    Highest education level: None   Occupational History    None   Tobacco Use    Smoking status: Never Smoker    Smokeless tobacco: Never Used   Substance and Sexual Activity    Alcohol use: None    Drug use: None    Sexual activity: None   Other Topics Concern    None   Social History Narrative    None     Social Determinants of Health     Financial Resource Strain:     Difficulty of Paying Living Expenses: Not on file   Food Insecurity:     Worried About Running Out of Food in the Last Year: Not on file    Drake of Food in the Last Year: Not on file   Transportation Needs:     Lack of Transportation (Medical): Not on file    Lack of Transportation (Non-Medical):  Not on file   Physical Activity:     Days of Exercise per Week: Not on file    Minutes of Exercise per Session: Not on file   Stress:     Feeling of Stress : Not on file   Social Connections:     Frequency of Communication with Friends and Family: Not on file    Frequency of Social Gatherings with Friends and Family: Not on file    Attends Gnosticism Services: Not on file   Adina Cancino Member of Clubs or Organizations: Not on file    Attends Club or Organization Meetings: Not on file    Marital Status: Not on file   Intimate Partner Violence:     Fear of Current or Ex-Partner: Not on file    Emotionally Abused: Not on file    Physically Abused: Not on file    Sexually Abused: Not on file   Housing Stability:     Unable to Pay for Housing in the Last Year: Not on file    Number of [place] in the Last Year: Not on file    Unstable Housing in the Last Year: Not on file       SCREENINGS    [place] Coma Scale  Eye Opening: Spontaneous  Best Verbal Response: Oriented  Best Motor Response: Obeys commands  [place] Coma Scale Score: 15        PHYSICAL EXAM    (up to 7 for level 4, 8 or more for level 5)     ED Triage Vitals [05/24/22 2050]   BP Temp Temp Source Heart Rate Resp SpO2 Height Weight   138/84 98 °F (36.7 °C) Oral 56 16 100 % 5' 6\" (1.676 m) 150 lb (68 kg)       Physical Exam  Vitals and nursing note reviewed. Constitutional:       Appearance: She is well-developed. HENT:      Head: Normocephalic. Right Ear: Tympanic membrane and ear canal normal.      Left Ear: Tympanic membrane and ear canal normal.      Mouth/Throat:      Mouth: Mucous membranes are moist.      Pharynx: Oropharynx is clear. Eyes:      Extraocular Movements: Extraocular movements intact. Conjunctiva/sclera: Conjunctivae normal.      Pupils: Pupils are equal, round, and reactive to light. Neck:      Trachea: No tracheal deviation. Cardiovascular:      Rate and Rhythm: Normal rate and regular rhythm. Heart sounds: Normal heart sounds. Pulmonary:      Effort: Pulmonary effort is normal.      Breath sounds: Normal breath sounds. Abdominal:      General: There is no distension. Palpations: Abdomen is soft. Tenderness: There is no abdominal tenderness. Musculoskeletal:         General: Normal range of motion. Cervical back: Normal range of motion and neck supple.  No rigidity or tenderness. Lymphadenopathy:      Cervical: No cervical adenopathy. Skin:     General: Skin is warm and dry. Capillary Refill: Capillary refill takes less than 2 seconds. Findings: No bruising or erythema. Neurological:      General: No focal deficit present. Mental Status: She is alert and oriented to person, place, and time. Mental status is at baseline. Cranial Nerves: No cranial nerve deficit. Sensory: No sensory deficit. Motor: No weakness. Coordination: Coordination normal.      Gait: Gait normal.         RESULTS     EKG: All EKG's are interpreted by the Emergency Department Physician who either signs or Co-signsthis chart in the absence of a cardiologist.      RADIOLOGY:   Barbie Maria Del Carmen such as CT, Ultrasound and MRI are read by the radiologist. Plain radiographic images are visualized and preliminarily interpreted by the emergency physician with the below findings:      Interpretation per the Radiologist below, if available at the time ofthis note:    CT HEAD WO CONTRAST   Final Result   1. No acute intracranial abnormality. 2.  Left frontal scalp hematoma. No acute facial fracture identified. 3.  No acute osseous abnormality identified in the cervical spine. CT MAXILLOFACIAL WO CONTRAST   Final Result   1. No acute intracranial abnormality. 2.  Left frontal scalp hematoma. No acute facial fracture identified. 3.  No acute osseous abnormality identified in the cervical spine. CT CERVICAL SPINE WO CONTRAST   Final Result   1. No acute intracranial abnormality. 2.  Left frontal scalp hematoma. No acute facial fracture identified. 3.  No acute osseous abnormality identified in the cervical spine.                ED BEDSIDE ULTRASOUND:   Performed by ED Physician - none    LABS:  Labs Reviewed   URINALYSIS WITH REFLEX TO CULTURE       All other labs were within normal range or not returned as of this dictation. EMERGENCY DEPARTMENT COURSE and DIFFERENTIAL DIAGNOSIS/MDM:   Vitals:    Vitals:    05/24/22 2050 05/24/22 2348   BP: 138/84 (!) 152/68   Pulse: 56 54   Resp: 16 16   Temp: 98 °F (36.7 °C)    TempSrc: Oral    SpO2: 100% 98%   Weight: 150 lb (68 kg)    Height: 5' 6\" (1.676 m)        Patient was given thefollowing medications:  Medications   tetanus-diphth-acell pertussis (BOOSTRIX) injection 0.5 mL (0.5 mLs IntraMUSCular Given 5/24/22 2239)       ED COURSE & MEDICAL DECISION MAKING    Pertinent Labs & Imaging studies reviewed. (See chart for details)   -  Patient seen and evaluated in the emergency department. -  Triage and nursing notes reviewed and incorporated. -  Old chart records reviewed and incorporated. -  Differential diagnosis includes: Differential Diagnosis: epidural hematoma, subdural hematoma, parenchymal brain contusion or bleed, subarachnoid hemorrhage, skull fracture, neck fracture or dislocation, other.    -  Work-up included:  See above  -  ED treatment included: See above  -  Results discussed with patient. Patient presents ED evaluation close head injury. On presentation there is an abrasion over the left upper forehead bridge of her nose. Cognitive General grossly intact. Wounds were cleaned and did not appear to be a laceration but rather abrasions and not read repair. Labs show unremarkable urinalysis. Imaging studies show no acute abnormalities on CT of the head max face or cervical spine. .  Patient feels well on reevaluation. Patient and and her  are amenable to discharge home and feels safe with discharge. The patient is agreeable with plan of care and disposition. REASSESSMENT          CRITICAL CARE TIME   Total Critical Care time was 10 minutes, excluding separately reportable procedures. There was a high probability of clinically significant/life threatening deterioration in the patient's condition which required my urgent intervention. CONSULTS:  None    PROCEDURES:  Unless otherwise noted below, none     Procedures    FINAL IMPRESSION      1. Injury of head, initial encounter    2.  Abrasion of face, initial encounter          DISPOSITION/PLAN   DISPOSITION Decision To Discharge 05/24/2022 11:30:19 PM      PATIENT REFERREDTO:  Ryan Astorga MD  [place] [id number]  [phone]    Schedule an appointment as soon as possible for a visit   As needed      DISCHARGEMEDICATIONS:  Discharge Medication List as of 5/24/2022 11:51 PM             (Please note that portions of this note were completed with a voice recognition program.  Efforts were made to edit the dictations but occasionally words are mis-transcribed.)    Ivan Perla MD (electronically signed)  Attending Emergency Physician          Ivan Perla MD  05/25/22 2161

## 2022-05-25 NOTE — ED NOTES
Pt instructed to follow up with PCP for facial wounds.  Assessed per Dr Tatianna Galan, LPN  79/79/[phone]

## 2022-05-25 NOTE — ED NOTES
Pt facial wounds cleansed with Hibiclens/Normal Saline. Dried blood cleansed from hands.      Karl Bautista LPN  65/89/95 1009

## 2022-07-24 PROCEDURE — 96374 THER/PROPH/DIAG INJ IV PUSH: CPT

## 2022-07-24 PROCEDURE — 99285 EMERGENCY DEPT VISIT HI MDM: CPT

## 2022-07-25 ENCOUNTER — APPOINTMENT (OUTPATIENT)
Dept: GENERAL RADIOLOGY | Age: 82
DRG: 178 | End: 2022-07-25
Payer: MEDICARE

## 2022-07-25 ENCOUNTER — HOSPITAL ENCOUNTER (INPATIENT)
Age: 82
LOS: 2 days | Discharge: HOME HEALTH | DRG: 178 | End: 2022-07-27
Attending: EMERGENCY MEDICINE | Admitting: INTERNAL MEDICINE
Payer: MEDICARE

## 2022-07-25 DIAGNOSIS — J96.01 ACUTE RESPIRATORY FAILURE WITH HYPOXIA (HCC): ICD-10-CM

## 2022-07-25 DIAGNOSIS — U07.1 COVID-19: Primary | ICD-10-CM

## 2022-07-25 PROBLEM — E03.9 ACQUIRED HYPOTHYROIDISM: Status: ACTIVE | Noted: 2022-07-25

## 2022-07-25 PROBLEM — J45.909 ASTHMA: Status: ACTIVE | Noted: 2022-07-25

## 2022-07-25 PROBLEM — G30.9 ALZHEIMER'S DEMENTIA WITHOUT BEHAVIORAL DISTURBANCE (HCC): Status: ACTIVE | Noted: 2022-07-25

## 2022-07-25 PROBLEM — J45.901 ASTHMA EXACERBATION ATTACKS: Status: ACTIVE | Noted: 2022-07-25

## 2022-07-25 PROBLEM — F02.80 ALZHEIMER'S DEMENTIA WITHOUT BEHAVIORAL DISTURBANCE (HCC): Status: ACTIVE | Noted: 2022-07-25

## 2022-07-25 PROBLEM — Z98.61 CAD S/P PERCUTANEOUS CORONARY ANGIOPLASTY: Status: ACTIVE | Noted: 2022-07-25

## 2022-07-25 PROBLEM — I25.10 CAD S/P PERCUTANEOUS CORONARY ANGIOPLASTY: Status: ACTIVE | Noted: 2022-07-25

## 2022-07-25 LAB
ANION GAP SERPL CALCULATED.3IONS-SCNC: 11 MMOL/L (ref 3–16)
BASOPHILS ABSOLUTE: 0 K/UL (ref 0–0.2)
BASOPHILS RELATIVE PERCENT: 0.4 %
BUN BLDV-MCNC: 16 MG/DL (ref 7–20)
C-REACTIVE PROTEIN: 4.1 MG/L (ref 0–5.1)
CALCIUM SERPL-MCNC: 9.8 MG/DL (ref 8.3–10.6)
CHLORIDE BLD-SCNC: 102 MMOL/L (ref 99–110)
CO2: 24 MMOL/L (ref 21–32)
CREAT SERPL-MCNC: 0.7 MG/DL (ref 0.6–1.2)
D DIMER: 0.45 UG/ML FEU (ref 0–0.6)
D DIMER: 0.5 UG/ML FEU (ref 0–0.6)
EKG ATRIAL RATE: 72 BPM
EKG DIAGNOSIS: NORMAL
EKG P AXIS: 67 DEGREES
EKG P-R INTERVAL: 156 MS
EKG Q-T INTERVAL: 374 MS
EKG QRS DURATION: 78 MS
EKG QTC CALCULATION (BAZETT): 409 MS
EKG R AXIS: -31 DEGREES
EKG T AXIS: 42 DEGREES
EKG VENTRICULAR RATE: 72 BPM
EOSINOPHILS ABSOLUTE: 0.1 K/UL (ref 0–0.6)
EOSINOPHILS RELATIVE PERCENT: 0.6 %
FIBRINOGEN: 397 MG/DL (ref 207–509)
GFR AFRICAN AMERICAN: >60
GFR NON-AFRICAN AMERICAN: >60
GLUCOSE BLD-MCNC: 123 MG/DL (ref 70–99)
HCT VFR BLD CALC: 40.3 % (ref 36–48)
HEMOGLOBIN: 13.6 G/DL (ref 12–16)
INR BLD: 1.2 (ref 0.87–1.14)
LYMPHOCYTES ABSOLUTE: 0.7 K/UL (ref 1–5.1)
LYMPHOCYTES RELATIVE PERCENT: 7.3 %
MCH RBC QN AUTO: 31.2 PG (ref 26–34)
MCHC RBC AUTO-ENTMCNC: 33.7 G/DL (ref 31–36)
MCV RBC AUTO: 92.7 FL (ref 80–100)
MONOCYTES ABSOLUTE: 1 K/UL (ref 0–1.3)
MONOCYTES RELATIVE PERCENT: 9.9 %
NEUTROPHILS ABSOLUTE: 8.2 K/UL (ref 1.7–7.7)
NEUTROPHILS RELATIVE PERCENT: 81.8 %
PDW BLD-RTO: 13.2 % (ref 12.4–15.4)
PLATELET # BLD: 209 K/UL (ref 135–450)
PMV BLD AUTO: 8.3 FL (ref 5–10.5)
POTASSIUM REFLEX MAGNESIUM: 4 MMOL/L (ref 3.5–5.1)
PROTHROMBIN TIME: 15 SEC (ref 11.7–14.5)
RBC # BLD: 4.34 M/UL (ref 4–5.2)
SARS-COV-2, NAAT: DETECTED
SODIUM BLD-SCNC: 137 MMOL/L (ref 136–145)
TROPONIN: <0.01 NG/ML
WBC # BLD: 10 K/UL (ref 4–11)

## 2022-07-25 PROCEDURE — 85610 PROTHROMBIN TIME: CPT

## 2022-07-25 PROCEDURE — 6360000002 HC RX W HCPCS: Performed by: INTERNAL MEDICINE

## 2022-07-25 PROCEDURE — 85379 FIBRIN DEGRADATION QUANT: CPT

## 2022-07-25 PROCEDURE — 2580000003 HC RX 258: Performed by: INTERNAL MEDICINE

## 2022-07-25 PROCEDURE — G0378 HOSPITAL OBSERVATION PER HR: HCPCS

## 2022-07-25 PROCEDURE — 6370000000 HC RX 637 (ALT 250 FOR IP): Performed by: INTERNAL MEDICINE

## 2022-07-25 PROCEDURE — 71045 X-RAY EXAM CHEST 1 VIEW: CPT

## 2022-07-25 PROCEDURE — 2060000000 HC ICU INTERMEDIATE R&B

## 2022-07-25 PROCEDURE — 36415 COLL VENOUS BLD VENIPUNCTURE: CPT

## 2022-07-25 PROCEDURE — 6370000000 HC RX 637 (ALT 250 FOR IP): Performed by: EMERGENCY MEDICINE

## 2022-07-25 PROCEDURE — 93005 ELECTROCARDIOGRAM TRACING: CPT | Performed by: EMERGENCY MEDICINE

## 2022-07-25 PROCEDURE — 84443 ASSAY THYROID STIM HORMONE: CPT

## 2022-07-25 PROCEDURE — 6360000002 HC RX W HCPCS: Performed by: EMERGENCY MEDICINE

## 2022-07-25 PROCEDURE — 85384 FIBRINOGEN ACTIVITY: CPT

## 2022-07-25 PROCEDURE — 85025 COMPLETE CBC W/AUTO DIFF WBC: CPT

## 2022-07-25 PROCEDURE — 2580000003 HC RX 258: Performed by: EMERGENCY MEDICINE

## 2022-07-25 PROCEDURE — 93010 ELECTROCARDIOGRAM REPORT: CPT | Performed by: INTERNAL MEDICINE

## 2022-07-25 PROCEDURE — 80048 BASIC METABOLIC PNL TOTAL CA: CPT

## 2022-07-25 PROCEDURE — 87635 SARS-COV-2 COVID-19 AMP PRB: CPT

## 2022-07-25 PROCEDURE — 96372 THER/PROPH/DIAG INJ SC/IM: CPT

## 2022-07-25 PROCEDURE — 86140 C-REACTIVE PROTEIN: CPT

## 2022-07-25 PROCEDURE — 84484 ASSAY OF TROPONIN QUANT: CPT

## 2022-07-25 PROCEDURE — 96361 HYDRATE IV INFUSION ADD-ON: CPT

## 2022-07-25 RX ORDER — 0.9 % SODIUM CHLORIDE 0.9 %
1000 INTRAVENOUS SOLUTION INTRAVENOUS ONCE
Status: COMPLETED | OUTPATIENT
Start: 2022-07-25 | End: 2022-07-25

## 2022-07-25 RX ORDER — ACETAMINOPHEN 325 MG/1
650 TABLET ORAL EVERY 6 HOURS PRN
Status: DISCONTINUED | OUTPATIENT
Start: 2022-07-25 | End: 2022-07-27 | Stop reason: HOSPADM

## 2022-07-25 RX ORDER — ALBUTEROL SULFATE 90 UG/1
2 AEROSOL, METERED RESPIRATORY (INHALATION) EVERY 4 HOURS PRN
Status: DISCONTINUED | OUTPATIENT
Start: 2022-07-25 | End: 2022-07-27 | Stop reason: HOSPADM

## 2022-07-25 RX ORDER — SODIUM CHLORIDE 0.9 % (FLUSH) 0.9 %
10 SYRINGE (ML) INJECTION PRN
Status: DISCONTINUED | OUTPATIENT
Start: 2022-07-25 | End: 2022-07-27 | Stop reason: HOSPADM

## 2022-07-25 RX ORDER — MAGNESIUM SULFATE 1 G/100ML
1000 INJECTION INTRAVENOUS PRN
Status: DISCONTINUED | OUTPATIENT
Start: 2022-07-25 | End: 2022-07-27 | Stop reason: HOSPADM

## 2022-07-25 RX ORDER — POLYETHYLENE GLYCOL 3350 17 G/17G
17 POWDER, FOR SOLUTION ORAL DAILY PRN
Status: DISCONTINUED | OUTPATIENT
Start: 2022-07-25 | End: 2022-07-27 | Stop reason: HOSPADM

## 2022-07-25 RX ORDER — ASPIRIN 81 MG/1
81 TABLET, CHEWABLE ORAL DAILY
COMMUNITY

## 2022-07-25 RX ORDER — LEVOTHYROXINE SODIUM 0.05 MG/1
50 TABLET ORAL DAILY
Status: DISCONTINUED | OUTPATIENT
Start: 2022-07-25 | End: 2022-07-27 | Stop reason: HOSPADM

## 2022-07-25 RX ORDER — MEMANTINE HYDROCHLORIDE 10 MG/1
10 TABLET ORAL 2 TIMES DAILY
COMMUNITY

## 2022-07-25 RX ORDER — LANOLIN ALCOHOL/MO/W.PET/CERES
3 CREAM (GRAM) TOPICAL NIGHTLY PRN
Status: DISCONTINUED | OUTPATIENT
Start: 2022-07-25 | End: 2022-07-27 | Stop reason: HOSPADM

## 2022-07-25 RX ORDER — CALCIUM CARBONATE 500(1250)
1000 TABLET ORAL DAILY
Status: DISCONTINUED | OUTPATIENT
Start: 2022-07-25 | End: 2022-07-27 | Stop reason: HOSPADM

## 2022-07-25 RX ORDER — OLANZAPINE 5 MG/1
2.5 TABLET ORAL NIGHTLY
Status: DISCONTINUED | OUTPATIENT
Start: 2022-07-25 | End: 2022-07-27 | Stop reason: HOSPADM

## 2022-07-25 RX ORDER — DEXAMETHASONE 4 MG/1
6 TABLET ORAL DAILY
Status: DISCONTINUED | OUTPATIENT
Start: 2022-07-25 | End: 2022-07-27 | Stop reason: HOSPADM

## 2022-07-25 RX ORDER — DEXAMETHASONE SODIUM PHOSPHATE 10 MG/ML
10 INJECTION INTRAMUSCULAR; INTRAVENOUS ONCE
Status: COMPLETED | OUTPATIENT
Start: 2022-07-25 | End: 2022-07-25

## 2022-07-25 RX ORDER — ACETAMINOPHEN 500 MG
1000 TABLET ORAL ONCE
Status: COMPLETED | OUTPATIENT
Start: 2022-07-25 | End: 2022-07-25

## 2022-07-25 RX ORDER — CALCIUM CARBONATE 500(1250)
1000 TABLET ORAL DAILY
COMMUNITY

## 2022-07-25 RX ORDER — SERTRALINE HYDROCHLORIDE 25 MG/1
25 TABLET, FILM COATED ORAL DAILY
COMMUNITY

## 2022-07-25 RX ORDER — GUAIFENESIN/DEXTROMETHORPHAN 100-10MG/5
5 SYRUP ORAL EVERY 4 HOURS PRN
Status: DISCONTINUED | OUTPATIENT
Start: 2022-07-25 | End: 2022-07-27 | Stop reason: HOSPADM

## 2022-07-25 RX ORDER — POTASSIUM CHLORIDE 20 MEQ/1
40 TABLET, EXTENDED RELEASE ORAL PRN
Status: DISCONTINUED | OUTPATIENT
Start: 2022-07-25 | End: 2022-07-27 | Stop reason: HOSPADM

## 2022-07-25 RX ORDER — POTASSIUM CHLORIDE 7.45 MG/ML
10 INJECTION INTRAVENOUS PRN
Status: DISCONTINUED | OUTPATIENT
Start: 2022-07-25 | End: 2022-07-27 | Stop reason: HOSPADM

## 2022-07-25 RX ORDER — ASPIRIN 81 MG/1
81 TABLET, CHEWABLE ORAL DAILY
Status: DISCONTINUED | OUTPATIENT
Start: 2022-07-25 | End: 2022-07-27 | Stop reason: HOSPADM

## 2022-07-25 RX ORDER — SENNA PLUS 8.6 MG/1
1 TABLET ORAL DAILY PRN
Status: DISCONTINUED | OUTPATIENT
Start: 2022-07-25 | End: 2022-07-27 | Stop reason: HOSPADM

## 2022-07-25 RX ORDER — MONTELUKAST SODIUM 10 MG/1
10 TABLET ORAL DAILY
Status: DISCONTINUED | OUTPATIENT
Start: 2022-07-25 | End: 2022-07-27 | Stop reason: HOSPADM

## 2022-07-25 RX ORDER — ROSUVASTATIN CALCIUM 20 MG/1
20 TABLET, COATED ORAL DAILY
COMMUNITY

## 2022-07-25 RX ORDER — SODIUM CHLORIDE 9 MG/ML
INJECTION, SOLUTION INTRAVENOUS PRN
Status: DISCONTINUED | OUTPATIENT
Start: 2022-07-25 | End: 2022-07-27 | Stop reason: HOSPADM

## 2022-07-25 RX ORDER — ACETAMINOPHEN 650 MG/1
650 SUPPOSITORY RECTAL EVERY 6 HOURS PRN
Status: DISCONTINUED | OUTPATIENT
Start: 2022-07-25 | End: 2022-07-27 | Stop reason: HOSPADM

## 2022-07-25 RX ORDER — VITAMIN B COMPLEX
2000 TABLET ORAL DAILY
Status: DISCONTINUED | OUTPATIENT
Start: 2022-07-25 | End: 2022-07-27 | Stop reason: HOSPADM

## 2022-07-25 RX ORDER — ROSUVASTATIN CALCIUM 10 MG/1
20 TABLET, COATED ORAL DAILY
Status: DISCONTINUED | OUTPATIENT
Start: 2022-07-25 | End: 2022-07-27 | Stop reason: HOSPADM

## 2022-07-25 RX ORDER — MEMANTINE HYDROCHLORIDE 5 MG/1
10 TABLET ORAL 2 TIMES DAILY
Status: DISCONTINUED | OUTPATIENT
Start: 2022-07-25 | End: 2022-07-27 | Stop reason: HOSPADM

## 2022-07-25 RX ORDER — ONDANSETRON 4 MG/1
4 TABLET, ORALLY DISINTEGRATING ORAL EVERY 8 HOURS PRN
Status: DISCONTINUED | OUTPATIENT
Start: 2022-07-25 | End: 2022-07-27 | Stop reason: HOSPADM

## 2022-07-25 RX ORDER — ONDANSETRON 2 MG/ML
4 INJECTION INTRAMUSCULAR; INTRAVENOUS EVERY 6 HOURS PRN
Status: DISCONTINUED | OUTPATIENT
Start: 2022-07-25 | End: 2022-07-27 | Stop reason: HOSPADM

## 2022-07-25 RX ORDER — OLANZAPINE 2.5 MG/1
2.5 TABLET ORAL NIGHTLY
COMMUNITY

## 2022-07-25 RX ORDER — BENZONATATE 100 MG/1
200 CAPSULE ORAL 3 TIMES DAILY PRN
Status: DISCONTINUED | OUTPATIENT
Start: 2022-07-25 | End: 2022-07-27 | Stop reason: HOSPADM

## 2022-07-25 RX ORDER — ENOXAPARIN SODIUM 100 MG/ML
30 INJECTION SUBCUTANEOUS 2 TIMES DAILY
Status: DISCONTINUED | OUTPATIENT
Start: 2022-07-25 | End: 2022-07-27 | Stop reason: HOSPADM

## 2022-07-25 RX ADMIN — DEXAMETHASONE SODIUM PHOSPHATE 10 MG: 10 INJECTION INTRAMUSCULAR; INTRAVENOUS at 02:38

## 2022-07-25 RX ADMIN — LEVOTHYROXINE SODIUM 50 MCG: 0.05 TABLET ORAL at 06:19

## 2022-07-25 RX ADMIN — SODIUM CHLORIDE 1000 ML: 9 INJECTION, SOLUTION INTRAVENOUS at 02:37

## 2022-07-25 RX ADMIN — CALCIUM 1000 MG: 500 TABLET ORAL at 08:14

## 2022-07-25 RX ADMIN — MEMANTINE 10 MG: 5 TABLET ORAL at 20:32

## 2022-07-25 RX ADMIN — Medication 2000 UNITS: at 20:32

## 2022-07-25 RX ADMIN — Medication 2000 UNITS: at 08:14

## 2022-07-25 RX ADMIN — SERTRALINE 25 MG: 50 TABLET, FILM COATED ORAL at 08:14

## 2022-07-25 RX ADMIN — OLANZAPINE 2.5 MG: 5 TABLET, FILM COATED ORAL at 20:32

## 2022-07-25 RX ADMIN — MEMANTINE 10 MG: 5 TABLET ORAL at 08:14

## 2022-07-25 RX ADMIN — ENOXAPARIN SODIUM 30 MG: 100 INJECTION SUBCUTANEOUS at 20:33

## 2022-07-25 RX ADMIN — MONTELUKAST SODIUM 10 MG: 10 TABLET ORAL at 08:14

## 2022-07-25 RX ADMIN — ACETAMINOPHEN 1000 MG: 500 TABLET ORAL at 01:20

## 2022-07-25 RX ADMIN — DEXAMETHASONE 6 MG: 4 TABLET ORAL at 06:19

## 2022-07-25 RX ADMIN — Medication 10 ML: at 20:33

## 2022-07-25 RX ADMIN — ROSUVASTATIN CALCIUM 20 MG: 10 TABLET, FILM COATED ORAL at 08:14

## 2022-07-25 RX ADMIN — ASPIRIN 81 MG: 81 TABLET, CHEWABLE ORAL at 08:14

## 2022-07-25 RX ADMIN — ENOXAPARIN SODIUM 30 MG: 100 INJECTION SUBCUTANEOUS at 08:14

## 2022-07-25 ASSESSMENT — PAIN SCALES - GENERAL
PAINLEVEL_OUTOF10: 0
PAINLEVEL_OUTOF10: 0

## 2022-07-25 ASSESSMENT — PAIN - FUNCTIONAL ASSESSMENT: PAIN_FUNCTIONAL_ASSESSMENT: NONE - DENIES PAIN

## 2022-07-25 NOTE — ED PROVIDER NOTES
[facility] Emergency Department      CHIEF COMPLAINT  Shortness of Breath ( COVID+ Saturday. C/o SOB & congestion)      HISTORY OF PRESENT ILLNESS  Thersia Essex is a 79 y.o. female with a history of dementia and diabetes presents with cough, congestion, fevers and malaise. Her  tested positive for COVID on Saturday. She developed symptoms around the same time. They did not do a home COVID test.  She is vaccinated for COVID and has had 1 booster shot. She denies chest pain. Her history is limited secondary to her baseline dementia. .   No other complaints, modifying factors or associated symptoms. I have reviewed the following from the nursing documentation. Past Medical History:   Diagnosis Date    Cerebrovascular disease     squamous cell carcinoma    Diabetes mellitus ([facility] [address]     Thyroid disease      Past Surgical History:   Procedure Laterality Date    APPENDECTOMY      CHOLECYSTECTOMY      MOHS SURGERY  04/01/2019     History reviewed. No pertinent family history. Social History     Socioeconomic History    Marital status:      Spouse name: Not on file    Number of children: Not on file    Years of education: Not on file    Highest education level: Not on file   Occupational History    Not on file   Tobacco Use    Smoking status: Never    Smokeless tobacco: Never   Vaping Use    Vaping Use: Never used   Substance and Sexual Activity    Alcohol use:  Yes     Alcohol/week: 1.0 standard drink     Types: 1 Glasses of wine per week     Comment: nightly    Drug use: Never    Sexual activity: Not on file   Other Topics Concern    Not on file   Social History Narrative    Not on file     Social Determinants of Health     Financial Resource Strain: Not on file   Food Insecurity: Not on file   Transportation Needs: Not on file   Physical Activity: Not on file   Stress: Not on file   Social Connections: Not on file   Intimate Partner Violence: Not on file   Housing Stability: Not neurological, genitourinary, musculoskeletal, integument systems and systems related to the presenting problem) are either stated in the preceding paragraph, were not pertinent or were negative for the symptoms and/or complaints related to the medical problem. PHYSICAL EXAM  /61   Pulse 64   Temp (!) 100.6 °F (38.1 °C) (Oral)   Resp 16   Ht 5' 6\" (1.676 m)   Wt 150 lb (68 kg)   SpO2 97%   BMI 24.21 kg/m²   GENERAL APPEARANCE: Awake and alert. Cooperative. No acute distress. HEAD: Normocephalic. Atraumatic. EYES: PERRL. EOM's grossly intact. ENT: Mucous membranes are moist.   NECK: Supple, trachea midline. HEART: RRR. LUNGS: Respirations unlabored. CTAB. Good air exchange. No wheezes, rales, or rhonchi. Speaking comfortably in full sentences. ABDOMEN: Soft. Non-distended. Non-tender. No guarding or rebound. EXTREMITIES: No peripheral edema. MAEE. No acute deformities. SKIN: Warm, dry and intact. No acute rashes. NEUROLOGICAL: Alert and oriented to self and place, not time. This is her baseline. CN II-XII grossly intact. PSYCHIATRIC: Normal mood and affect. LABS  I have reviewed all labs for this visit.    Results for orders placed or performed during the hospital encounter of 07/25/22   COVID-19, Rapid    Specimen: Nasopharyngeal Swab   Result Value Ref Range    SARS-CoV-2, NAAT DETECTED (AA) Not Detected   Troponin   Result Value Ref Range    Troponin <0.01 <0.01 ng/mL   CBC with Auto Differential   Result Value Ref Range    WBC 10.0 4.0 - 11.0 K/uL    RBC 4.34 4.00 - 5.20 M/uL    Hemoglobin 13.6 12.0 - 16.0 g/dL    Hematocrit 40.3 36.0 - 48.0 %    MCV 92.7 80.0 - 100.0 fL    MCH 31.2 26.0 - 34.0 pg    MCHC 33.7 31.0 - 36.0 g/dL    RDW 13.2 12.4 - 15.4 %    Platelets 138 056 - 295 K/uL    MPV 8.3 5.0 - 10.5 fL    Neutrophils % 81.8 %    Lymphocytes % 7.3 %    Monocytes % 9.9 %    Eosinophils % 0.6 %    Basophils % 0.4 %    Neutrophils Absolute 8.2 (H) 1.7 - 7.7 K/uL    Lymphocytes Absolute 0.7 (L) 1.0 - 5.1 K/uL    Monocytes Absolute 1.0 0.0 - 1.3 K/uL    Eosinophils Absolute 0.1 0.0 - 0.6 K/uL    Basophils Absolute 0.0 0.0 - 0.2 K/uL   Basic Metabolic Panel w/ Reflex to MG   Result Value Ref Range    Sodium 137 136 - 145 mmol/L    Potassium reflex Magnesium 4.0 3.5 - 5.1 mmol/L    Chloride 102 99 - 110 mmol/L    CO2 24 21 - 32 mmol/L    Anion Gap 11 3 - 16    Glucose 123 (H) 70 - 99 mg/dL    BUN 16 7 - 20 mg/dL    Creatinine 0.7 0.6 - 1.2 mg/dL    GFR Non-African American >60 >60    GFR African American >60 >60    Calcium 9.8 8.3 - 10.6 mg/dL   D-Dimer, Quantitative   Result Value Ref Range    D-Dimer, Quant 0.50 0.00 - 0.60 ug/mL FEU   Fibrinogen   Result Value Ref Range    Fibrinogen 397 207 - 509 mg/dL   Protime-INR   Result Value Ref Range    Protime 15.0 (H) 11.7 - 14.5 sec    INR 1.20 (H) 0.87 - 1.14   EKG 12 Lead   Result Value Ref Range    Ventricular Rate 72 BPM    Atrial Rate 72 BPM    P-R Interval 156 ms    QRS Duration 78 ms    Q-T Interval 374 ms    QTc Calculation (Bazett) 409 ms    P Axis 67 degrees    R Axis -31 degrees    T Axis 42 degrees    Diagnosis       Normal sinus rhythm with sinus arrhythmiaLeft axis deviationNonspecific ST and T wave abnormalityAbnormal ECGWhen compared with ECG of 10-MAY-2020 23:41,Premature atrial complexes are no longer Present       EKG  The Ekg interpreted by myself  normal sinus rhythm with a rate of 72  Axis is   Normal  QTc is  normal  Intervals and Durations are unremarkable. No specific ST-T wave changes appreciated. No evidence of acute ischemia. No significant change from prior EKG dated 5/10/2020          RADIOLOGY  X-RAYS: ALL IMAGES INCLUDING PLAIN FILMS, CT, ULTRASOUND AND MRI HAVE BEEN READ BY THE RADIOLOGIST. I have personally reviewed plain film images and have reviewed the radiology reports. XR CHEST PORTABLE   Final Result      Overlying EKG lead artifact limiting the exam.      Borderline cardiomegaly.       Chronic obstructive lung changes with mild subsegmental linear atelectasis or   scarring along the lung bases which is less prominent. Rechecks: Physical assessment performed. With ambulation the patient's oxygen sats dropped to 87%. Heart rate did increase slightly. She is not in distress. When she sits back in the bed her sats rebounded up to the mid 90s. Sepsis:  Is this patient to be included in the SEP-1 Core Measure due to severe sepsis or septic shock? No   Exclusion criteria - the patient is NOT to be included for SEP-1 Core Measure due to:  Viral etiology found or highly suspected (including COVID-19) without concomitant bacterial infection           ED COURSE/MDM  Patient seen and evaluated. Here the patient is afebrile with normal vitals signs, other than low-grade fever here and hypoxia with ambulation. Old records reviewed. Here the patient is testing positive for COVID. The remainder of her lab work is reassuring. With ambulation she does desat to 87%. Chest x-ray is normal.  In light of the hypoxia we will admit her. I have given her IV fluids and IV Decadron here. Labs and imaging reviewed and results discussed with patient. Patient was reassessed as noted above . Plan of care discussed with patient. Patient in agreement with plan. CLINICAL IMPRESSION  1. COVID-19    2. Acute respiratory failure with hypoxia (HCC)        Blood pressure 131/61, pulse 64, temperature (!) 100.6 °F (38.1 °C), temperature source Oral, resp. rate 16, height 5' 6\" (1.676 m), weight 150 lb (68 kg), SpO2 97 %. DISPOSITION  Lita Brower was admitted in stable condition. Eileen Bob MD am the primary clinician of record.     (Please note this note was completed with a voice recognition program.  Efforts were made to edit the dictations but occasionally words are mis-transcribed.)       Rizwana Lake MD  07/25/22 [id number]

## 2022-07-25 NOTE — PROGRESS NOTES
Pt admitted to [address] [facility]. Pt AO xperson, place and situation. Pt does state several times that she has dementia. Pt  unable to answer most questions about her health. Pt on RA SPO2 95%. Denies SOB. Up to bathroom with assist, pt tolerated well. All current needs met at this time. Bed alarm on for safety.

## 2022-07-25 NOTE — H&P
Hospital Medicine History & Physical      Patient: Thersia Essex  :  1940  MRN:  [id number]    Date of Service: 22    Chief Complaint   Patient presents with    Shortness of Breath      COVID+ Saturday. C/o SOB & congestion       HISTORY OF PRESENT ILLNESS:    Thersia Essex is a 79 y.o. female. She presented to the ER from home by private vehicle. She has a h/o dementia. Her  accompanies her and provides most of the history. She only just mentioned feeling ill at all the afternoon of  and he has noticed her coughing a lot. Her  tested positive for COVID-19 on . The patient has been coughing and has seemed congested throughout. She has been weak and having an especially hard time getting around. She is able to ambulate at baseline with a walker. Review of Systems:  All pertinent positives and negatives are as noted in the HPI section. All other systems were reviewed and are negative. Past Medical History:   Diagnosis Date    Cerebrovascular disease     squamous cell carcinoma    Diabetes mellitus ([facility] [address]     Thyroid disease        Past Surgical History:   Procedure Laterality Date    APPENDECTOMY      CHOLECYSTECTOMY      MOHS SURGERY  2019     Outpatient Medications  Medication Sig Dispensed Refills Start Date End Date Status   VITAMIN B COMPLEX-C PO   Take 50 mg by mouth daily. 0     Active   Vitamin Mixture (RYAN-C PO)   Take 500 mg by mouth daily. 0     Active   polyethylene glycol (GLYCOLAX,MIRALAX) PACK   Take 17 g by mouth nightly. 0 10/13/2013   Active   Cholecalciferol (VITAMIN D3) 2000 UNITS CAPS   Take 2,000 Units by mouth daily. 0     Active   Calcium Carbonate-Vit D-Min (CALTRATE 600+D PLUS MINIS) 600-800 MG-UNIT TABS   Take 1 tablet by mouth daily. 0     Active   NAMZARIC 28-10 MG CP24   Take 1 capsule by mouth at bedtime. 0 2020   Active   alendronate (FOSAMAX) 35 MG TABS   Take 35 mg by mouth once a week. Thursdays   0     Active   [employer] ASPIRIN ADULT LOW DOSE 81 MG CHEW   CHEW 1 TABLET BY MOUTH EVERY DAY 90 tablet   1 11/05/2021   Active   sertraline (ZOLOFT) 25 MG TABS    Indications: Adjustment disorder with depressed mood TAKE 1 TABLET BY MOUTH EVERY DAY 90 tablet   1 12/15/2021   Active   OLANZapine (ZYPREXA) 2.5 mg tablet    Indications: Late onset Alzheimer's dementia without behavioral disturbance (HCC), Mood disorder (HCC) Take 1 tablet by mouth nightly. 90 tablet   0 01/19/2022   Active   rosuvastatin (CRESTOR) 20 MG TABS   TAKE 1 TABLET BY MOUTH EVERY DAY AT NIGHT 90 tablet   1 04/18/2022   Active   mupirocin (BACTROBAN) 2 % OINT    Indications: Skin abrasion Apply topically 3 (three) times daily. 30 g   0 05/25/2022   Active   levothyroxine (SYNTHROID, LEVOTHROID) 50 MCG TABS   TAKE 1 TABLET BY MOUTH EVERY DAY 90 tablet   1 06/13/2022   Active   montelukast (SINGULAIR) 10 MG TABS   TAKE 1 TABLET BY MOUTH EVERY DAY 90 tablet   1 07/15/2022   Active       Allergies:   Egg white [albumen, egg]    Social:   reports that she has never smoked. She has never used smokeless tobacco.   reports current alcohol use of about 1.0 standard drink per week. Social History     Substance and Sexual Activity   Drug Use Never     Family History  Medical History Relation Name Comments   Breast cancer Daughter [address] Daughter 1 [place] breast   High BP Daughter 2       High BP Daughter 4       COPD Father        High BP Father        Stroke Maternal Grandfather        Breast cancer Mother        Cancer Mother    breast   Osteoporosis Mother           PHYSICAL EXAM:  I performed this physical examination.     Vitals:  Patient Vitals for the past 24 hrs:   BP Temp Temp src Pulse Resp SpO2 Height Weight   07/25/22 0219 124/63 -- -- 70 17 96 % -- --   07/25/22 0149 (!) 150/63 -- -- 64 19 97 % -- --   07/25/22 0119 (!) 143/59 -- -- 78 17 98 % -- --   07/25/22 0050 (!) 146/67 -- -- 73 20 98 % -- --   07/25/22 0018 (!) 144/81 (!) 100.6 °F (38.1 °C) Oral 76 19 98 % 5' 6\" (1.676 m) 150 lb (68 kg)   07/25/22 0017 (!) 144/81 -- -- 80 25 97 % -- --     Room air    GEN:  Appearance:  age appropriate female in NAD . Level of Consciousness:  alert . Orientation:  self and \"hospital\"    HEENT: Sclera anicteric.  no conjunctival chemosis. moist mucus membranes. no specific or diagnostic oral lesions. NECK:  no signs of meningismus. Jugular veins not distended. Carotid pulses  2+.  no cervical lymphadenopathy. no thyromegaly. CV:  regular rhythm. normal S1 & S2.    no murmur. no rub.  no gallop. PULM:  Chest excursion is symmetric. Breath sounds are generally vesicular. Adventitious sounds:  none    AB:  Abdominal shape is normal.  Bowel sounds are active. Generally soft to palpation. no tenderness is present. no involuntary guarding. no rebound guarding. EXTR:  Skin is warm. Capillary refill brisk. no specific or pathognomic rash. no clubbing. no pitting edema. no active wound or ulcer. Pulses 2+ x 4  Numerous superficial varicose veins in both legs, especially on the left. LABS:  Lab Results   Component Value Date    WBC 10.0 07/25/2022    HGB 13.6 07/25/2022    HCT 40.3 07/25/2022    MCV 92.7 07/25/2022     07/25/2022     Lab Results   Component Value Date    CREATININE 0.7 07/25/2022    BUN 16 07/25/2022     07/25/2022    K 4.0 07/25/2022     07/25/2022    CO2 24 07/25/2022     No results found for: ALT, AST, GGT, ALKPHOS, BILITOT  Lab Results   Component Value Date    TROPONINI <0.01 07/25/2022     No results for input(s): PHART, VFZ3MRL, PO2ART in the last 72 hours.     IMAGING:  XR CHEST PORTABLE    Result Date: 7/25/2022  EXAMINATION: ONE XRAY VIEW OF THE CHEST 7/25/2022 12:38 am COMPARISON: 05/10/2020 HISTORY: ORDERING SYSTEM PROVIDED HISTORY: sob, concern for covid TECHNOLOGIST PROVIDED HISTORY: Reason for exam:->sob, concern for covid Reason for Exam: SOB COVID-associated inflammatory indices. -  Start decadron 6mg PO daily. Continue home montelukast.    CAD s/p PCI  -  Continue home ASA and statin. Dementia  -  Fall precautions. Continue home sertraline and olanzapine. Hypothyroidism  -  Continue home levothyroxine. DVT prophylaxis: SCDs, lovenox  Code Status:  [facility] - [facility] directives [facility]  Disposition:  Inpatient for the foreseeable future.     Maxine Pisano MD MD

## 2022-07-25 NOTE — PROGRESS NOTES
4 Eyes Admission Assessment     I agree as the admission nurse that 2 RN's have performed a thorough Head to Toe Skin Assessment on the patient. ALL assessment sites listed below have been assessed on admission. Areas assessed by both nurses:   [x]   Head, Face, and Ears   [x]   Shoulders, Back, and Chest  [x]   Arms, Elbows, and Hands   [x]   Coccyx, Sacrum, and Ischium  [x]   Legs, Feet, and Heels      No skin issues noted   Does the Patient have Skin Breakdown?   No         Christian Prevention initiated:  No   Wound Care Orders initiated:  No      [facility] nurse consulted for Pressure Injury (Stage 3,4, Unstageable, DTI, NWPT, and Complex wounds) or Christian score 18 or lower:  No      Nurse 1 eSignature: Electronically signed by Jay Oropeza RN on 7/25/22 at 6:10 AM EDT    **SHARE this note so that the co-signing nurse is able to place an eSignature**    Nurse 2 eSignature: {Esignature:[id number]}

## 2022-07-25 NOTE — ED NOTES
Ambulated pt approx 50 feet on room air while monitoring pulse oximetry. Prior to ambulation, pt was resting comfortably with HR and SpO2 of 96% and 67 bpm. During ambulation, pt's SpO2 and HR were 87% and 88 bpm. Pt stated that she did not feel SOB, CP, dizziness or lightheadedness. After ambulation, pt was returned to bed SpO2 and HR were 97% and 78 bpm. Dr. Venessa Yoo made aware.           Cookie Zavala  07/25/22 [address] [facility]  07/25/22 [id number]

## 2022-07-25 NOTE — ED NOTES
Ambulated pt approx 200 feet on room air while monitoring pulse oximetry. Prior to ambulation, pt was resting comfortably with HR and SpO2 of 98% and 85 bpm. During ambulation, pt's SpO2 and HR were 86% and 100 bpm. Pt stated that she did not feel SOB, CP, dizziness or lightheadedness. After ambulation, pt was returned to bed SpO2 and HR were 97% and 77 bpm. Eleonora made aware.           Goldy Faulkner  07/25/22 0015

## 2022-07-26 LAB
A/G RATIO: 1.8 (ref 1.1–2.2)
ALBUMIN SERPL-MCNC: 4.2 G/DL (ref 3.4–5)
ALP BLD-CCNC: 92 U/L (ref 40–129)
ALT SERPL-CCNC: 16 U/L (ref 10–40)
ANION GAP SERPL CALCULATED.3IONS-SCNC: 13 MMOL/L (ref 3–16)
APTT: 31.3 SEC (ref 23–34.3)
AST SERPL-CCNC: 22 U/L (ref 15–37)
BASOPHILS ABSOLUTE: 0 K/UL (ref 0–0.2)
BASOPHILS RELATIVE PERCENT: 0.4 %
BILIRUB SERPL-MCNC: 0.4 MG/DL (ref 0–1)
BUN BLDV-MCNC: 13 MG/DL (ref 7–20)
C-REACTIVE PROTEIN: 3.3 MG/L (ref 0–5.1)
CALCIUM SERPL-MCNC: 10.1 MG/DL (ref 8.3–10.6)
CHLORIDE BLD-SCNC: 106 MMOL/L (ref 99–110)
CO2: 22 MMOL/L (ref 21–32)
CREAT SERPL-MCNC: 0.6 MG/DL (ref 0.6–1.2)
D DIMER: 0.4 UG/ML FEU (ref 0–0.6)
EKG ATRIAL RATE: 50 BPM
EKG DIAGNOSIS: NORMAL
EKG P AXIS: 70 DEGREES
EKG P-R INTERVAL: 134 MS
EKG Q-T INTERVAL: 446 MS
EKG QRS DURATION: 86 MS
EKG QTC CALCULATION (BAZETT): 406 MS
EKG R AXIS: -10 DEGREES
EKG T AXIS: 31 DEGREES
EKG VENTRICULAR RATE: 50 BPM
EOSINOPHILS ABSOLUTE: 0 K/UL (ref 0–0.6)
EOSINOPHILS RELATIVE PERCENT: 0.1 %
FERRITIN: 89.7 NG/ML (ref 15–150)
FIBRINOGEN: 374 MG/DL (ref 207–509)
GFR AFRICAN AMERICAN: >60
GFR NON-AFRICAN AMERICAN: >60
GLUCOSE BLD-MCNC: 148 MG/DL (ref 70–99)
HCT VFR BLD CALC: 37.4 % (ref 36–48)
HEMOGLOBIN: 12.8 G/DL (ref 12–16)
INR BLD: 1.1 (ref 0.87–1.14)
LACTATE DEHYDROGENASE: 190 U/L (ref 100–190)
LYMPHOCYTES ABSOLUTE: 1.6 K/UL (ref 1–5.1)
LYMPHOCYTES RELATIVE PERCENT: 11.8 %
MCH RBC QN AUTO: 31.3 PG (ref 26–34)
MCHC RBC AUTO-ENTMCNC: 34.2 G/DL (ref 31–36)
MCV RBC AUTO: 91.5 FL (ref 80–100)
MONOCYTES ABSOLUTE: 1.4 K/UL (ref 0–1.3)
MONOCYTES RELATIVE PERCENT: 10.8 %
NEUTROPHILS ABSOLUTE: 10.3 K/UL (ref 1.7–7.7)
NEUTROPHILS RELATIVE PERCENT: 76.9 %
PDW BLD-RTO: 13.4 % (ref 12.4–15.4)
PLATELET # BLD: 210 K/UL (ref 135–450)
PLATELET SLIDE REVIEW: ADEQUATE
PMV BLD AUTO: 9.4 FL (ref 5–10.5)
POTASSIUM REFLEX MAGNESIUM: 3.8 MMOL/L (ref 3.5–5.1)
PROTHROMBIN TIME: 14 SEC (ref 11.7–14.5)
RBC # BLD: 4.09 M/UL (ref 4–5.2)
SODIUM BLD-SCNC: 141 MMOL/L (ref 136–145)
TOTAL PROTEIN: 6.6 G/DL (ref 6.4–8.2)
TSH REFLEX FT4: 1.04 UIU/ML (ref 0.27–4.2)
WBC # BLD: 13.4 K/UL (ref 4–11)

## 2022-07-26 PROCEDURE — 80053 COMPREHEN METABOLIC PANEL: CPT

## 2022-07-26 PROCEDURE — 6370000000 HC RX 637 (ALT 250 FOR IP): Performed by: INTERNAL MEDICINE

## 2022-07-26 PROCEDURE — G0378 HOSPITAL OBSERVATION PER HR: HCPCS

## 2022-07-26 PROCEDURE — 6360000002 HC RX W HCPCS: Performed by: INTERNAL MEDICINE

## 2022-07-26 PROCEDURE — 97116 GAIT TRAINING THERAPY: CPT

## 2022-07-26 PROCEDURE — 85610 PROTHROMBIN TIME: CPT

## 2022-07-26 PROCEDURE — 1200000000 HC SEMI PRIVATE

## 2022-07-26 PROCEDURE — 97530 THERAPEUTIC ACTIVITIES: CPT

## 2022-07-26 PROCEDURE — 83615 LACTATE (LD) (LDH) ENZYME: CPT

## 2022-07-26 PROCEDURE — 86140 C-REACTIVE PROTEIN: CPT

## 2022-07-26 PROCEDURE — 97535 SELF CARE MNGMENT TRAINING: CPT

## 2022-07-26 PROCEDURE — 85025 COMPLETE CBC W/AUTO DIFF WBC: CPT

## 2022-07-26 PROCEDURE — 85730 THROMBOPLASTIN TIME PARTIAL: CPT

## 2022-07-26 PROCEDURE — 93010 ELECTROCARDIOGRAM REPORT: CPT | Performed by: INTERNAL MEDICINE

## 2022-07-26 PROCEDURE — 97162 PT EVAL MOD COMPLEX 30 MIN: CPT

## 2022-07-26 PROCEDURE — 96372 THER/PROPH/DIAG INJ SC/IM: CPT

## 2022-07-26 PROCEDURE — 85379 FIBRIN DEGRADATION QUANT: CPT

## 2022-07-26 PROCEDURE — 36415 COLL VENOUS BLD VENIPUNCTURE: CPT

## 2022-07-26 PROCEDURE — 82728 ASSAY OF FERRITIN: CPT

## 2022-07-26 PROCEDURE — 85384 FIBRINOGEN ACTIVITY: CPT

## 2022-07-26 PROCEDURE — 93005 ELECTROCARDIOGRAM TRACING: CPT | Performed by: INTERNAL MEDICINE

## 2022-07-26 PROCEDURE — 2580000003 HC RX 258: Performed by: INTERNAL MEDICINE

## 2022-07-26 PROCEDURE — 97166 OT EVAL MOD COMPLEX 45 MIN: CPT

## 2022-07-26 RX ADMIN — ROSUVASTATIN CALCIUM 20 MG: 10 TABLET, FILM COATED ORAL at 09:40

## 2022-07-26 RX ADMIN — DEXAMETHASONE 6 MG: 4 TABLET ORAL at 09:40

## 2022-07-26 RX ADMIN — SERTRALINE 25 MG: 50 TABLET, FILM COATED ORAL at 09:41

## 2022-07-26 RX ADMIN — MEMANTINE 10 MG: 5 TABLET ORAL at 20:46

## 2022-07-26 RX ADMIN — MONTELUKAST SODIUM 10 MG: 10 TABLET ORAL at 09:40

## 2022-07-26 RX ADMIN — OLANZAPINE 2.5 MG: 5 TABLET, FILM COATED ORAL at 20:46

## 2022-07-26 RX ADMIN — ASPIRIN 81 MG: 81 TABLET, CHEWABLE ORAL at 09:40

## 2022-07-26 RX ADMIN — MEMANTINE 10 MG: 5 TABLET ORAL at 09:40

## 2022-07-26 RX ADMIN — Medication 2000 UNITS: at 20:46

## 2022-07-26 RX ADMIN — Medication 10 ML: at 20:50

## 2022-07-26 RX ADMIN — CALCIUM 1000 MG: 500 TABLET ORAL at 09:40

## 2022-07-26 RX ADMIN — Medication 3 MG: at 00:28

## 2022-07-26 RX ADMIN — LEVOTHYROXINE SODIUM 50 MCG: 0.05 TABLET ORAL at 05:15

## 2022-07-26 RX ADMIN — ENOXAPARIN SODIUM 30 MG: 100 INJECTION SUBCUTANEOUS at 20:51

## 2022-07-26 ASSESSMENT — PAIN SCALES - GENERAL: PAINLEVEL_OUTOF10: 0

## 2022-07-26 NOTE — PROGRESS NOTES
Occupational Therapy  Facility/Department: [place] [address] - MED SURG/ORTHO  Occupational Therapy Initial Assessment/Treatment     Name: Jesse Reece  : 1940  MRN: [id number]  Date of Service: 2022    Discharge Recommendations:  24 hour supervision or assist, Home with Home health OT, S Level 1      Jesse Reece scored a 17/24 on the AM-PAC ADL Inpatient form. Current research shows that an AM-PAC score of 18 or greater is typically associated with a discharge to the patient's home setting. Based on the patient's AM-PAC score, and their current ADL deficits, it is recommended that the patient have 2-3 sessions per week of Occupational Therapy at d/c to increase the patient's independence. At this time, this patient demonstrates the endurance and safety to discharge home with  (home services) and a follow up treatment frequency of 2-3x/wk. Please see assessment section for further patient specific details. If patient discharges prior to next session this note will serve as a discharge summary. Please see below for the latest assessment towards goals. Patient Diagnosis(es): The primary encounter diagnosis was COVID-19. A diagnosis of Acute respiratory failure with hypoxia (HCC) was also pertinent to this visit. Past Medical History:  has a past medical history of Cerebrovascular disease, Diabetes mellitus ([address] and Thyroid disease. Past Surgical History:  has a past surgical history that includes Cholecystectomy; Appendectomy; and Mohs surgery (2019). Assessment   Performance deficits / Impairments: Decreased functional mobility ; Decreased strength;Decreased endurance;Decreased ADL status; Decreased safe awareness;Decreased cognition;Decreased balance  Assessment: Pt functioning slightly below baseline due to deficits listed above. Pt required grossly CGA/MIN for mobility due to cognition. Anticipate pt will be safe to d/c home with close 24hr supervision and HHOT.   Prognosis: Good  Decision Making: Medium Complexity  REQUIRES OT FOLLOW-UP: Yes  Activity Tolerance  Activity Tolerance: Patient limited by fatigue;Patient Tolerated treatment well;Treatment limited secondary to decreased cognition  Activity Tolerance Comments: Increased time and processing with OT evaluation. Pt denies any SOB with activity; SpO2 above 90%        Plan   Plan  Times per Week: 2-3x/wk  Current Treatment Recommendations: Safety education & training, Self-Care / ADL, Balance training, Functional mobility training, Strengthening, Patient/Caregiver education & training, Equipment evaluation, education, & procurement, Endurance training     Restrictions  Restrictions/Precautions  Restrictions/Precautions: Fall Risk, Isolation  Position Activity Restriction  Other position/activity restrictions: IV, tele, COVID+    Subjective   General  Chart Reviewed: Yes, Orders, Progress Notes, History and Physical  Patient assessed for rehabilitation services?: Yes  Additional Pertinent Hx: Dementia  Family / Caregiver Present: No  Referring Practitioner: Gayla Boland MD  Diagnosis: SOB; COVID +  Subjective  Subjective: Pt in bed, stating \" I feel like shit, double shit. \" Agreeable to OT evaluation. Pt's daugher on phone and provided PLOF.   General Comment  Comments: RN okay for therapy     Social/Functional History  Social/Functional History  Lives With: Spouse  Type of Home: House  Home Layout: One level  Home Access: Stairs to enter without rails  Entrance Stairs - Number of Steps: 2  Bathroom Shower/Tub: Walk-in shower  Bathroom Toilet: Handicap height  Bathroom Equipment: Shower chair  Home Equipment: Frank Solano, 4 wheeled  Has the patient had two or more falls in the past year or any fall with injury in the past year?: Yes  ADL Assistance: Needs assistance (shower transfer, sometimes dressing)  Ambulation Assistance: Needs assistance (CGA)  Transfer Assistance: Needs assistance  Active : No  Additional assisted with LB management due to time constraint     Activity Tolerance  Activity Tolerance: Patient tolerated evaluation without incident        Vision  Vision: Impaired  Vision Exceptions: Wears glasses at all times  Hearing  Hearing: Exceptions to [facility]  Hearing Exceptions: Bilateral hearing aid  Orientation  Overall Orientation Status: Impaired  Orientation Level: Oriented to place;Oriented to time;Oriented to person;Disoriented to situation                  Education Given To: Patient  Education Provided: Role of Therapy;Plan of Care;Transfer Training;ADL Adaptive Strategies;Precautions; Energy Conservation; Fall Prevention Strategies;Orientation  Barriers to Learning: Cognition  Education Outcome: Verbalized understanding;Continued education needed                          AM-PAC Score        AM-PAC Inpatient Daily Activity Raw Score: 17 (07/26/22 1607)  AM-PAC Inpatient ADL T-Scale Score : 37.26 (07/26/22 1607)  ADL Inpatient CMS 0-100% Score: 50.11 (07/26/22 1607)  ADL Inpatient CMS G-Code Modifier : CK (07/26/22 1607)    Tinneti Score       Goals  Short Term Goals  Time Frame for Short term goals: by 8/04/22  Short Term Goal 1: Pt will complete dressing with min A  Short Term Goal 2: Pt will complete toileting with supervision  Short Term Goal 3: Pt will complete toilet transfer with supervision       Therapy Time   Individual Concurrent Group Co-treatment   Time In 9519         Time Out 1431         Minutes 38         Timed Code Treatment Minutes: STEPHY Munoz/PAO

## 2022-07-26 NOTE — PROGRESS NOTES
Hospitalist Progress Note      PCP: Abhinav Luke MD    Date of Admission: 7/25/2022    Chief Complaint: [facility] Course: h and p reviewed     Subjective:  c/o tiredness   No cough / sputum / fever /D or hypoxia       Medications:  Reviewed    Infusion Medications    sodium chloride       Scheduled Medications    aspirin  81 mg Oral Daily    Vitamin D  2,000 Units Oral Daily    montelukast  10 mg Oral Daily    OLANZapine  2.5 mg Oral Nightly    rosuvastatin  20 mg Oral Daily    sertraline  25 mg Oral Daily    memantine  10 mg Oral BID    levothyroxine  50 mcg Oral Daily    calcium elemental  1,000 mg Oral Daily    enoxaparin  30 mg SubCUTAneous BID    dexamethasone  6 mg Oral Daily     PRN Meds: sodium chloride flush, sodium chloride, potassium chloride **OR** potassium alternative oral replacement **OR** potassium chloride, magnesium sulfate, ondansetron **OR** ondansetron, guaiFENesin-dextromethorphan, acetaminophen **OR** acetaminophen, polyethylene glycol, senna, melatonin, benzonatate, albuterol sulfate HFA      Intake/Output Summary (Last 24 hours) at 7/26/2022 1312  Last data filed at 7/25/2022 1800  Gross per 24 hour   Intake 480 ml   Output 500 ml   Net -20 ml       Physical Exam Performed:    /67   Pulse (!) 45   Temp 98.1 °F (36.7 °C)   Resp 14   Ht 5' 6\" (1.676 m)   Wt 159 lb 8 oz (72.3 kg)   SpO2 97%   BMI 25.74 kg/m²     General appearance: No apparent distress, appears stated age and cooperative. HEENT:  t. Conjunctivae/corneas clear. Neck: Supple, with full range of motion. No jugular venous distention. Trachea midline. Respiratory:  Normal respiratory effort. Clear to auscultation, bilaterally without Rales/Wheezes/Rhonchi. Cardiovascular: Regular rate and rhythm with normal S1/S2 without murmurs, rubs or gallops. Abdomen: Soft, non-tender, non-distended with normal bowel sounds. Musculoskeletal: No clubbing, cyanosis or edema bilaterally.   Full range of motion without deformity. Skin: Skin color, texture, turgor normal.  No rashes or lesions. Neurologic:  Neurovascularly intact without any focal sensory/motor deficits. Psychiatric: Alert and oriented, thought content appropriate, normal insight  Capillary Refill: Brisk,3 seconds, normal   Peripheral Pulses: +2 palpable, equal bilaterally       Labs:   Recent Labs     07/25/22  0124 07/26/22  0448   WBC 10.0 13.4*   HGB 13.6 12.8   HCT 40.3 37.4    210     Recent Labs     07/25/22  0124 07/26/22  0448    141   K 4.0 3.8    106   CO2 24 22   BUN 16 13   CREATININE 0.7 0.6   CALCIUM 9.8 10.1     Recent Labs     07/26/22  0448   AST 22   ALT 16   BILITOT 0.4   ALKPHOS 92     Recent Labs     07/25/22  0400 07/26/22 0448   INR 1.20* 1.10     Recent Labs     07/25/22 0124   TROPONINI <0.01       Urinalysis:      Lab Results   Component Value Date/Time    NITRU Negative 05/24/2022 10:27 PM    BLOODU Negative 05/24/2022 10:27 PM    SPECGRAV 1.015 05/24/2022 10:27 PM    GLUCOSEU Negative 05/24/2022 10:27 PM       Radiology:  XR CHEST PORTABLE   Final Result      Overlying EKG lead artifact limiting the exam.      Borderline cardiomegaly. Chronic obstructive lung changes with mild subsegmental linear atelectasis or   scarring along the lung bases which is less prominent. Assessment/Plan:    Active Hospital Problems    Diagnosis     CAD S/P percutaneous coronary angioplasty [I25.10, Z98.61]      Priority: Medium    Alzheimer's dementia without behavioral disturbance ([facility] [address] [G30.9, F02.80]      Priority: Medium    Asthma [J45.909]      Priority: Medium    Acquired hypothyroidism [E03.9]      Priority: Medium    2019 novel coronavirus disease (COVID-19) [U07.1]      Priority: Medium    Asthma exacerbation attacks [J45.901]      Priority: Medium       Asthma Exacerbation, COVID-19  -  Date of onset 7/24 .  Has received three doses of the Moderna vaccine against COVID-19, most recently in December, 2021.  -  No O2 requirement while at rest.  SpO2 drops to 86-87% with ambulation.  -  Titrate level of respiratory support according to patient's needs. Target goal SpO2 = 90-94%. Currently patient is requiring no support while at rest.  -  General supportive care to include as needed antipyretics, antiemetics, antitussives, bronchodilators, etc..  -  Monitor COVID-associated inflammatory indices. -  Start decadron 6mg PO daily. Continue home montelukast.  - improving      CAD s/p PCI  -  Continue home ASA and statin. Dementia  -  Fall precautions. Continue home sertraline and olanzapine. Hypothyroidism  -  Continue home levothyroxine, tsh pending     Sinus kb - had w/u including holter in 2021   HE goes up with activity and BP stable  Tele , tsh     DVT Prophylaxis: Lovenox   Diet: ADULT DIET;  Regular; Low Fat/Low Chol/High Fiber/AIME  Code Status: DNR-CC    PT/OT Eval Status: ordered     Dispo - possibly tomorrow   Pt feels very tired to go home today     Juana Velez MD

## 2022-07-26 NOTE — PLAN OF CARE
From about 7pm to 12a. m. pt was up and down about every 90 mins to void. She was happy and giggly every trip. She had to be verbally guided the entire way and throughout the process. I tucked her in at mn, she stated she could not sleep. She watched her show at 10 and the news at 6. I gave her her prn melatonin at midnight and she was up another time around 2a. m. she eventually fell asleep. Her hr dipped into the upper 30's off and on sustaining in the high 40's and 50's. Upon wakening for vitals her hr increased to the 80's. But once sound asleep again her hr dipped into the 30's and was 36 for about 15seconds.      Tonny dobbins.

## 2022-07-26 NOTE — PROGRESS NOTES
Physical Therapy  Facility/Department: [place] [address] - MED SURG/ORTHO  Physical Therapy Initial Assessment/Treatment    Name: Rossana Madrigal  : 1940  MRN: [id number]  Date of Service: 2022    Discharge Recommendations:  24 hour supervision or assist, Home with Home health PT   PT Equipment Recommendations  Equipment Needed: No      Patient Diagnosis(es): The primary encounter diagnosis was COVID-19. A diagnosis of Acute respiratory failure with hypoxia (HCC) was also pertinent to this visit. Past Medical History:  has a past medical history of Cerebrovascular disease, Diabetes mellitus ([address] and Thyroid disease. Past Surgical History:  has a past surgical history that includes Cholecystectomy; Appendectomy; and Mohs surgery (2019). Assessment   Body Structures, Functions, Activity Limitations Requiring Skilled Therapeutic Intervention: Decreased functional mobility ; Decreased endurance;Decreased cognition;Decreased strength;Decreased safe awareness;Decreased balance  Assessment: Pt presents to [facility] with acute respiratory failure and COVID 19+. PTA, pt has dementia and lives with  who assists with ADLs and mobility as needed. Pt currently functioning below baseline level; requiring CGA-min(A ) for ambulation with RW. Pt demo difficulty with motor planning at times, which family reports is baseline. pt would benefit from continued skilled PT to address current deficits.  Recommend home with 24hr supervision and HHPT upon d/c  Treatment Diagnosis: impaired functional mobility  Therapy Prognosis: Good  Decision Making: Medium Complexity  Requires PT Follow-Up: Yes  Activity Tolerance  Activity Tolerance: Patient tolerated evaluation without incident     Plan   Plan  Plan: 3-5 times per week  Current Treatment Recommendations: Strengthening, Balance training, Functional mobility training, Transfer training, Endurance training, Neuromuscular re-education, Stair training, Gait training, Pain management, Home exercise program, Safety education & training, Patient/Caregiver education & training, Therapeutic activities, Equipment evaluation, education, & procurement  Safety Devices  Type of Devices: All fall risk precautions in place, Bed alarm in place, Gait belt, Nurse notified, Left in bed, Patient at risk for falls     Restrictions  Restrictions/Precautions  Restrictions/Precautions: Fall Risk, Isolation  Position Activity Restriction  Other position/activity restrictions: IV, tele, COVID+     Subjective   Pain: Denies  General  Chart Reviewed: Yes  Patient assessed for rehabilitation services?: Yes  Response To Previous Treatment: Not applicable  Family / Caregiver Present: No  Referring Practitioner: Kenn Mackenzie MD  Referral Date : 07/26/22  Diagnosis: Acute Respiratory Failure  Follows Commands: Within Functional Limits  General Comment  Comments: RN cleared pt for PT eval  Subjective  Subjective: Pt supine in bed upon arrival, agreeable to PT eval. Reports \"feeling miserable\" but does not elaborate         Social/Functional History  Social/Functional History  Lives With: Spouse  Type of Home: House  Home Layout: One level  Home Access: Stairs to enter without rails  Entrance Stairs - Number of Steps: 2  Bathroom Shower/Tub: Walk-in shower  Bathroom Toilet: Handicap height  Bathroom Equipment: Shower chair  Home Equipment: [employer], 4 wheeled  Has the patient had two or more falls in the past year or any fall with injury in the past year?: Yes  ADL Assistance: Needs assistance (shower transfer, sometimes dressing)  Ambulation Assistance: Needs assistance (CGA)  Transfer Assistance: Needs assistance  Active : No  Additional Comments: Pt reports having 5 daughters and 1 son. PLOF confirmed via daughters Ken Draper and Wai funes via phone call. Reports someone is always with patient when ambulating.     Vision/Hearing  Vision  Vision: Impaired  Vision Exceptions: Wears glasses at all times  Hearing  Hearing: Exceptions to [facility]  Hearing Exceptions: Bilateral hearing aid      Cognition   Orientation  Overall Orientation Status: Impaired  Orientation Level: Oriented to place;Oriented to time;Oriented to person;Disoriented to situation     Objective   Heart Rate: 50  Heart Rate Source: Monitor  BP: 114/66  BP Location: Left upper arm  BP Method: Automatic  Patient Position: Semi fowlers  MAP (Calculated): 82  Resp: 16  SpO2: 92 %  O2 Device: None (Room air)        Gross Assessment  AROM: Within functional limits  PROM: Within functional limits  Strength: Generally decreased, functional  Coordination: Within functional limits  Tone: Normal  Sensation: Intact     Bed Mobility Training  Bed Mobility Training: Yes  Supine to Sit: Minimum assistance (Increased time; HOB elevated; required min A for trunk)  Sit to Supine: Stand-by assistance  Balance  Sitting: Intact  Standing: With support  Transfer Training  Transfer Training: Yes  Overall Level of Assistance: Contact-guard assistance;Assist X1;Adaptive equipment; Additional time (increased time and processing; verbal cues for safety with hand placement)  Sit to Stand: Contact-guard assistance;Assist X1;Adaptive equipment  Stand to Sit: Assist X1;Contact-guard assistance; Adaptive equipment  Toilet Transfer: Assist X1;Contact-guard assistance; Adaptive equipment; Additional time (increased time with processing and turning RW;)  Gait Training  Gait Training: Yes  Gait  Overall Level of Assistance: Assist X1;Minimum assistance;Contact-guard assistance; Additional time; Adaptive equipment  Interventions: Verbal cues; Safety awareness training; Tactile cues  Base of Support: Narrowed  Speed/[place]: Slow  Gait Abnormalities: Decreased step clearance;Trunk sway increased (Difficulty with motor planning at times, VC to use RW entire time)  Assistive Device: Walker, rolling;Gait belt       AM-PAC Score  AM-PAC Inpatient Mobility Raw Score : 18 (07/26/22 2059)  AM-PAC Inpatient T-Scale Score : 43.63 (07/26/22 1415)  Mobility Inpatient CMS 0-100% Score: 46.58 (07/26/22 1415)  Mobility Inpatient CMS G-Code Modifier : CK (07/26/22 1415)       Goals  Short Term Goals  Time Frame for Short term goals: 7 days (8/2/22) unless otherwise noted  Short term goal 1: Pt will perform bed mobility with CGA  Short term goal 2: Pt will perform transfers with CGA  Short term goal 3: Pt will ambulate 50 ft with LRAD and CGA  Short term goal 4: Pt will perform 12-15 reps of BLE exercises to improve strength and mobility  Patient Goals   Patient goals : \"to go home with \"       Education  Patient Education  Education Given To: Patient  Education Provided: Role of Therapy;Plan of Care;Transfer Training  Education Method: Verbal  Barriers to Learning: Cognition  Education Outcome: Verbalized understanding;Continued education needed      Therapy Time   Individual Concurrent Group Co-treatment   Time In 1350         Time Out 1425         Minutes 35         Timed Code Treatment Minutes: 25 Minutes (10 min eval)     If pt is unable to be seen after this session, please let this note serve as discharge summary. Please see case management note for discharge disposition. Thank you.     Sagar Mcqueen, PT

## 2022-07-27 VITALS
HEIGHT: 66 IN | BODY MASS INDEX: 25.63 KG/M2 | HEART RATE: 58 BPM | WEIGHT: 159.5 LBS | TEMPERATURE: 96.4 F | SYSTOLIC BLOOD PRESSURE: 114 MMHG | OXYGEN SATURATION: 94 % | RESPIRATION RATE: 16 BRPM | DIASTOLIC BLOOD PRESSURE: 73 MMHG

## 2022-07-27 LAB
A/G RATIO: 1.6 (ref 1.1–2.2)
ALBUMIN SERPL-MCNC: 4.2 G/DL (ref 3.4–5)
ALP BLD-CCNC: 89 U/L (ref 40–129)
ALT SERPL-CCNC: 14 U/L (ref 10–40)
ANION GAP SERPL CALCULATED.3IONS-SCNC: 13 MMOL/L (ref 3–16)
APTT: 28.8 SEC (ref 23–34.3)
AST SERPL-CCNC: 21 U/L (ref 15–37)
BASOPHILS ABSOLUTE: 0 K/UL (ref 0–0.2)
BASOPHILS RELATIVE PERCENT: 0.2 %
BILIRUB SERPL-MCNC: 0.4 MG/DL (ref 0–1)
BUN BLDV-MCNC: 19 MG/DL (ref 7–20)
C-REACTIVE PROTEIN: 5.8 MG/L (ref 0–5.1)
CALCIUM SERPL-MCNC: 10 MG/DL (ref 8.3–10.6)
CHLORIDE BLD-SCNC: 107 MMOL/L (ref 99–110)
CO2: 23 MMOL/L (ref 21–32)
CREAT SERPL-MCNC: 0.7 MG/DL (ref 0.6–1.2)
D DIMER: 0.35 UG/ML FEU (ref 0–0.6)
EOSINOPHILS ABSOLUTE: 0 K/UL (ref 0–0.6)
EOSINOPHILS RELATIVE PERCENT: 0.1 %
FERRITIN: 93.8 NG/ML (ref 15–150)
FIBRINOGEN: 389 MG/DL (ref 207–509)
GFR AFRICAN AMERICAN: >60
GFR NON-AFRICAN AMERICAN: >60
GLUCOSE BLD-MCNC: 121 MG/DL (ref 70–99)
HCT VFR BLD CALC: 41.6 % (ref 36–48)
HEMOGLOBIN: 13.9 G/DL (ref 12–16)
INR BLD: 1.08 (ref 0.87–1.14)
LACTATE DEHYDROGENASE: 201 U/L (ref 100–190)
LYMPHOCYTES ABSOLUTE: 1.5 K/UL (ref 1–5.1)
LYMPHOCYTES RELATIVE PERCENT: 15.7 %
MCH RBC QN AUTO: 31.2 PG (ref 26–34)
MCHC RBC AUTO-ENTMCNC: 33.5 G/DL (ref 31–36)
MCV RBC AUTO: 93.2 FL (ref 80–100)
MONOCYTES ABSOLUTE: 1.2 K/UL (ref 0–1.3)
MONOCYTES RELATIVE PERCENT: 12.9 %
NEUTROPHILS ABSOLUTE: 6.8 K/UL (ref 1.7–7.7)
NEUTROPHILS RELATIVE PERCENT: 71.1 %
PDW BLD-RTO: 13.8 % (ref 12.4–15.4)
PLATELET # BLD: 240 K/UL (ref 135–450)
PMV BLD AUTO: 8.7 FL (ref 5–10.5)
POTASSIUM REFLEX MAGNESIUM: 3.8 MMOL/L (ref 3.5–5.1)
PROTHROMBIN TIME: 13.9 SEC (ref 11.7–14.5)
RBC # BLD: 4.46 M/UL (ref 4–5.2)
SODIUM BLD-SCNC: 143 MMOL/L (ref 136–145)
TOTAL PROTEIN: 6.9 G/DL (ref 6.4–8.2)
WBC # BLD: 9.5 K/UL (ref 4–11)

## 2022-07-27 PROCEDURE — 86140 C-REACTIVE PROTEIN: CPT

## 2022-07-27 PROCEDURE — 96372 THER/PROPH/DIAG INJ SC/IM: CPT

## 2022-07-27 PROCEDURE — G0378 HOSPITAL OBSERVATION PER HR: HCPCS

## 2022-07-27 PROCEDURE — 80053 COMPREHEN METABOLIC PANEL: CPT

## 2022-07-27 PROCEDURE — 85384 FIBRINOGEN ACTIVITY: CPT

## 2022-07-27 PROCEDURE — 97535 SELF CARE MNGMENT TRAINING: CPT

## 2022-07-27 PROCEDURE — 97530 THERAPEUTIC ACTIVITIES: CPT

## 2022-07-27 PROCEDURE — 83615 LACTATE (LD) (LDH) ENZYME: CPT

## 2022-07-27 PROCEDURE — 85610 PROTHROMBIN TIME: CPT

## 2022-07-27 PROCEDURE — 6360000002 HC RX W HCPCS: Performed by: INTERNAL MEDICINE

## 2022-07-27 PROCEDURE — 85730 THROMBOPLASTIN TIME PARTIAL: CPT

## 2022-07-27 PROCEDURE — 82728 ASSAY OF FERRITIN: CPT

## 2022-07-27 PROCEDURE — 85379 FIBRIN DEGRADATION QUANT: CPT

## 2022-07-27 PROCEDURE — 6370000000 HC RX 637 (ALT 250 FOR IP): Performed by: INTERNAL MEDICINE

## 2022-07-27 PROCEDURE — 85025 COMPLETE CBC W/AUTO DIFF WBC: CPT

## 2022-07-27 PROCEDURE — 36415 COLL VENOUS BLD VENIPUNCTURE: CPT

## 2022-07-27 RX ADMIN — ASPIRIN 81 MG: 81 TABLET, CHEWABLE ORAL at 10:04

## 2022-07-27 RX ADMIN — CALCIUM 1000 MG: 500 TABLET ORAL at 10:05

## 2022-07-27 RX ADMIN — MEMANTINE 10 MG: 5 TABLET ORAL at 10:05

## 2022-07-27 RX ADMIN — SERTRALINE 25 MG: 50 TABLET, FILM COATED ORAL at 10:05

## 2022-07-27 RX ADMIN — ROSUVASTATIN CALCIUM 20 MG: 10 TABLET, FILM COATED ORAL at 10:06

## 2022-07-27 RX ADMIN — DEXAMETHASONE 6 MG: 4 TABLET ORAL at 10:06

## 2022-07-27 RX ADMIN — ENOXAPARIN SODIUM 30 MG: 100 INJECTION SUBCUTANEOUS at 10:04

## 2022-07-27 RX ADMIN — MONTELUKAST SODIUM 10 MG: 10 TABLET ORAL at 10:05

## 2022-07-27 RX ADMIN — LEVOTHYROXINE SODIUM 50 MCG: 0.05 TABLET ORAL at 06:39

## 2022-07-27 ASSESSMENT — PAIN SCALES - GENERAL: PAINLEVEL_OUTOF10: 0

## 2022-07-27 NOTE — DISCHARGE SUMMARY
Hospital Medicine Discharge Summary    Patient ID: Martyn Schwab      Patient's PCP: Lucien Cranker, MD    Admit Date: 7/25/2022     Discharge Date:  7/27/2022     Admitting Provider: Grover Esteban MD     Discharge Provider: Juana Velez MD     Discharge Diagnoses: Active Hospital Problems    Diagnosis     CAD S/P percutaneous coronary angioplasty [I25.10, Z98.61]      Priority: Medium    Alzheimer's dementia without behavioral disturbance ([facility] [address] [G30.9, F02.80]      Priority: Medium    Asthma [J45.909]      Priority: Medium    Acquired hypothyroidism [E03.9]      Priority: Medium    2019 novel coronavirus disease (COVID-19) [U07.1]      Priority: Medium    Asthma exacerbation attacks [J45.901]      Priority: Medium       The patient was seen and examined on day of discharge and this discharge summary is in conjunction with any daily progress note from day of discharge. Hospital Course:  Asthma Exacerbation, COVID-19  -  Date of onset 7/24 . Has received three doses of the Moderna vaccine against COVID-19, most recently in December, 2021.  -  No O2 requirement while at rest.  SpO2 drops to 86-87% with ambulation.  -  Titrate level of respiratory support according to patient's needs. Target goal SpO2 = 90-94%. Currently patient is requiring no support while at rest or with activities   - dc home   - isolation precautions advised     CAD s/p PCI  -  Continue home ASA and statin. Dementia  -  Fall precautions. Continue home sertraline and olanzapine.      Hypothyroidism  -  Continue home levothyroxine, tsh WNL     Sinus kb - had w/u including holter in 2021  HR goes up with activity and BP stable  Tele- no abnormalities - , tsh WNL              Physical Exam Performed:     /73   Pulse 58   Temp (!) 96.4 °F (35.8 °C) (Axillary)   Resp 16   Ht 5' 6\" (1.676 m)   Wt 159 lb 8 oz (72.3 kg)   SpO2 94%   BMI 25.74 kg/m²       General appearance: No apparent distress, appears stated age and cooperative. HEENT:  t. Conjunctivae/corneas clear. Neck: Supple, with full range of motion. No jugular venous distention. Trachea midline. Respiratory:  Normal respiratory effort. Clear to auscultation, bilaterally without Rales/Wheezes/Rhonchi. Cardiovascular: Regular rate and rhythm with normal S1/S2 without murmurs, rubs or gallops. Abdomen: Soft, non-tender, non-distended with normal bowel sounds. Musculoskeletal: No clubbing, cyanosis or edema bilaterally. Full range of motion without deformity. Skin: Skin color, texture, turgor normal.  No rashes or lesions. Neurologic:  Neurovascularly intact without any focal sensory/motor deficits. Psychiatric: Alert and oriented, thought content appropriate, normal insight  Capillary Refill: Brisk,3 seconds, normal  Peripheral Pulses: +2 palpable, equal bilaterally       Labs: For convenience and continuity at follow-up the following most recent labs are provided:      CBC:    Lab Results   Component Value Date/Time    WBC 9.5 07/27/2022 06:20 AM    HGB 13.9 07/27/2022 06:20 AM    HCT 41.6 07/27/2022 06:20 AM     07/27/2022 06:20 AM       Renal:    Lab Results   Component Value Date/Time     07/27/2022 06:20 AM    K 3.8 07/27/2022 06:20 AM     07/27/2022 06:20 AM    CO2 23 07/27/2022 06:20 AM    BUN 19 07/27/2022 06:20 AM    CREATININE 0.7 07/27/2022 06:20 AM    CALCIUM 10.0 07/27/2022 06:20 AM         Significant Diagnostic Studies    Radiology:   XR CHEST PORTABLE   Final Result      Overlying EKG lead artifact limiting the exam.      Borderline cardiomegaly. Chronic obstructive lung changes with mild subsegmental linear atelectasis or   scarring along the lung bases which is less prominent.                 Consults:     IP CONSULT TO HOSPITALIST  IP CONSULT TO HOME CARE NEEDS    Disposition:  [facility]    Condition at Discharge: Stable    Discharge Instructions/Follow-up:  PCP    Code Status:  DNR-CC     Activity: activity as tolerated    Diet: regular diet      Discharge Medications:     Current Discharge Medication List             Details   calcium carbonate (OSCAL) 500 MG TABS tablet Take 1,000 mg by mouth in the morning. aspirin 81 MG chewable tablet Take 81 mg by mouth in the morning. memantine (NAMENDA) 10 MG tablet Take 10 mg by mouth in the morning and 10 mg before bedtime. OLANZapine (ZYPREXA) 2.5 MG tablet Take 2.5 mg by mouth nightly      rosuvastatin (CRESTOR) 20 MG tablet Take 20 mg by mouth in the morning. sertraline (ZOLOFT) 25 MG tablet Take 25 mg by mouth in the morning. Cholecalciferol (VITAMIN D3) 125 MCG (5000 UT) TABS Take 1 tablet by mouth      montelukast (SINGULAIR) 10 MG tablet TAKE 1 TABLET BY MOUTH EVERY DAY  Refills: 1      alendronate (FOSAMAX) 35 MG tablet TAKE 1 TAB BY MOUTH EVERY 7 DAYS (ONCE WEEKLY). Refills: 5      levothyroxine (SYNTHROID) 25 MCG tablet Take 50 mcg by mouth in the morning. Refills: 4             Time Spent on discharge is  40  minutes in the examination, evaluation, counseling and review of medications and discharge plan. Signed:    Otf Price MD   7/27/2022      Thank you Gina Mccormick MD for the opportunity to be involved in this patient's care. If you have any questions or concerns, please feel free to contact me at [phone].

## 2022-07-27 NOTE — CARE COORDINATION
CASE MANAGEMENT DISCHARGE SUMMARY      Discharge to: [facility] [address] [facility] completed: [facility] Exemption Notification (HENS) completed: na    IMM given: (date) 7/25    New Durable Medical Equipment ordered/agency: na    Transportation:    Family/car:yes       Confirmed discharge plan with:     Patient: yes     Family:  yes   Name: Spouse Contact number:     Facility/Agency, name:  [facility] faxed   Phone number for report to facility: doc     RN, name: Danuta Rg    Note: Discharging nurse to complete [facility], reconcile [facility], and place final copy with patient's discharge packet. RN to ensure that written prescriptions for  Level II medications are sent with patient to the facility as per protocol.      Asia De La Fuente RN

## 2022-07-27 NOTE — PLAN OF CARE
Patient and family given discharge instructions. All questions and concerns were addressed. Patient dressing was changed. Wound clean dry and intact. Patient drain removed and dressing placed. Patient wheeled to car with all patient belongings.

## 2022-07-27 NOTE — PROGRESS NOTES
Physical Therapy    Pt working with OT on approach, pt also has D/C order . Will con't  to follow per POC and try again later as time permits.     apomio, EFS#[id number]

## 2022-07-27 NOTE — PROGRESS NOTES
Occupational Therapy  Facility/Department: [facility] C5 - MED SURG/ORTHO  Daily Treatment Note  NAME: Radha Schuler  : 1940  MRN: [id number]    Date of Service: 2022    Discharge Recommendations:  24 hour supervision or assist, Home with Home health OT, S Level 1     Patient Diagnosis(es): The primary encounter diagnosis was COVID-19. A diagnosis of Acute respiratory failure with hypoxia (HCC) was also pertinent to this visit. Assessment    Assessment: Pt tolerated therapy fair this date and was received seated up in bed and agreeable to participate. Pt completed bed mobility w/ SBA overall and STS to RW w/ CGA. Pt ambulated to bathroom and completed grooming activities in stance at the sink. Pt maintained O2 sats > 90% all throughout activity w/ no complaints of pain or fatigue. Pt required increased time and max VCs for participation this date, specifically for task initiation, sequencing, and safety. Pt continues to present below her PLOF and is most limited by her cognition at this time. She will benefit from continued services while inpatient, as well as skilled OT through [facility] and close 24hr assist upon d/c to maximize safety in the home environment. Activity Tolerance: Patient tolerated evaluation without incident  Discharge Recommendations: 24 hour supervision or assist;Home with Home health OT;S Level 1      Plan   Plan  Times per Week: 2-3x/wk  Current Treatment Recommendations: Safety education & training;Self-Care / ADL; Balance training;Functional mobility training;Strengthening;Patient/Caregiver education & training;Equipment evaluation, education, & procurement; Endurance training     Restrictions  Restrictions/Precautions  Restrictions/Precautions: Fall Risk;Isolation  Position Activity Restriction  Other position/activity restrictions: IV, tele, COVID+    Subjective   Subjective  Subjective: Pt seated in bed upon arrival, agreeable to participate  Pain: Pt denies any pain  Orientation  Overall Orientation Status: Impaired  Orientation Level: Oriented to place;Oriented to person;Disoriented to situation;Oriented to time (pt mixed up year she was born and the current year)  Pain: Denies any pain    Cognition  Overall Cognitive Status: Exceptions  Arousal/Alertness: Delayed responses to stimuli  Following Commands: Follows one step commands with repetition  Attention Span: Attends with cues to redirect  Memory: Decreased recall of biographical Information;Decreased short term memory  Safety Judgement: Decreased awareness of need for safety  Problem Solving: Assistance required to implement solutions;Decreased awareness of errors  Insights: Decreased awareness of deficits  Initiation: Requires cues for all  Sequencing: Requires cues for all  Cognition Comment: pt reports she has alzheimer's disease at baseline. Pt required max VC for participation in all activity this date. Objective    Vitals  Vitals  Heart Rate: 58  Heart Rate Source: Monitor  BP: 114/73  BP Location: Left upper arm  BP Method: Automatic  Patient Position: Semi fowlers  MAP (Calculated): 86.67  SpO2: 94 %  O2 Device: None (Room air)    Bed Mobility Training  Bed Mobility Training: Yes  Overall Level of Assistance: Contact-guard assistance  Interventions: Manual cues; Verbal cues; Visual cues  Supine to Sit: Contact-guard assistance (HOB elevated, use of BR)  Sit to Supine: Minimum assistance (manage RLE into bed)  Balance  Sitting: Intact  Standing: Impaired  Standing - Static: Fair;Good  Standing - Dynamic: Fair (pt required maxA for walker management in bathroom. Pt unable to identify errors in use and required assistance to correct.  Increased time needed ambulating to/from bathroom d/t pt confusion and for safety w/ walker management)  Transfer Training  Transfer Training: Yes  Overall Level of Assistance: Contact-guard assistance;Assist X1;Additional time (max VCs for hand placement and safety)  Interventions: Manual cues; Verbal cues; Visual cues  Sit to Stand: Contact-guard assistance;Assist X1;Additional time (w/ RW)  Stand to Sit: Assist X1;Contact-guard assistance; Additional time (w/ RW)     ADL  Grooming: Supervision (standing at sink ~7 min)  Grooming Skilled Clinical Factors: pt requires max VCs to sequence oral care, face washing, hair brushing  LE Dressing: Contact guard assistance; Increased time to complete;Verbal cueing;Setup  LE Dressing Skilled Clinical Factors: max VCs required for pt to don socks  Toileting Skilled Clinical Factors: pt declining need to toilet/toilet transfer  Additional Comments: pt requires max VCs to complete all ADLs this date     Safety Devices  Type of Devices: All fall risk precautions in place; Bed alarm in place;Gait belt;Nurse notified; Left in bed;Patient at risk for falls;Call light within reach     Patient Education  Education Given To: Patient  Education Provided: Role of Therapy;Plan of Care;Transfer Training;ADL Adaptive Strategies;Precautions; Energy Conservation; Fall Prevention Strategies;Orientation  Education Method: Verbal  Barriers to Learning: Cognition  Education Outcome: Verbalized understanding;Continued education needed    AM-[facility] Daily Activity Inpatient   How much help for putting on and taking off regular lower body clothing?: A Little  How much help for Bathing?: A Lot  How much help for Toileting?: A Little  How much help for putting on and taking off regular upper body clothing?: A Little  How much help for taking care of personal grooming?: A Little  How much help for eating meals?: A Little  AM-[facility] Inpatient Daily Activity Raw Score: 17  AM-[facility] Inpatient ADL T-Scale Score : 37.26  ADL Inpatient CMS 0-100% Score: 50.11  ADL Inpatient CMS G-Code Modifier : CK       Goals  Short Term Goals  Time Frame for Short term goals: by 8/04/22  Short Term Goal 1: Pt will complete dressing with min A - 7/27 progressing, pt completed LB dressing w/ CGA, MARCELLE UB dressing  Short Term Goal 2: Pt will complete toileting with supervision  Short Term Goal 3: Pt will complete toilet transfer with supervision  Patient Goals   Patient goals : \"to go home\"     Therapy Time   Individual Concurrent Group Co-treatment   Time In 1408         Time Out 1503         Minutes 55         Timed Code Treatment Minutes: 54 Minutes     If pt is unable to be seen after this session, please let this note serve as discharge summary. Please see case management note for discharge disposition. Thank you.      Magdalena Styles, OTR/L

## 2022-07-27 NOTE — DISCHARGE INSTR - COC
Continuity of Care Form    Patient Name: Jeanine Oquendo   :  1940  MRN:  [id number]    Admit date:  2022  Discharge date:  2022    Code Status Order: DNR-CC   Advance Directives:     Admitting Physician:  Herminia Oleary MD  PCP: Jordana Colbert MD    Discharging Nurse: [facility] Unit/Room#: 8256/2447-19  Discharging Unit Phone Number: ***    Emergency Contact:   Extended Emergency Contact Information  Primary Emergency Contact: Regina Kwok  Address: [address] [place] [address] Avenue           [address] [facility] Phone: [phone]  Relation: Spouse    Past Surgical History:  Past Surgical History:   Procedure Laterality Date    APPENDECTOMY      CHOLECYSTECTOMY      MOHS SURGERY  2019       Immunization History:   Immunization History   Administered Date(s) Administered    Tdap (Boostrix, Adacel) 2022       Active Problems:  Patient Active Problem List   Diagnosis Code    Squamous cell carcinoma of left lower leg C44.729    Visit for suture removal Z48.02    CAD S/P percutaneous coronary angioplasty I25.10, Z98.61    Alzheimer's dementia without behavioral disturbance ([facility] [address] G30.9, F02.80    Asthma J45.909    Acquired hypothyroidism E03.9    2019 novel coronavirus disease (COVID-19) U07.1    Asthma exacerbation attacks J45.901       Isolation/Infection:   Isolation            Droplet Plus          Patient Infection Status       Infection Onset Added Last Indicated Last Indicated By Review Planned Expiration Resolved Resolved By    COVID-19 22 COVID-19, Rapid 22      Resolved    COVID-19 (Rule Out) 22 COVID-19, Rapid (Ordered)   22 Rule-Out Test Resulted            Nurse Assessment:  Last Vital Signs: /75   Pulse 59   Temp (!) 96.4 °F (35.8 °C) (Axillary)   Resp 16   Ht 5' 6\" (1.676 m)   Wt 159 lb 8 oz (72.3 kg)   SpO2 97%   BMI 25.74 kg/m²     Last documented pain score (0-10 scale): Pain Level: 0  Last Weight:   Wt Readings from Last 1 Encounters:   07/26/22 159 lb 8 oz (72.3 kg)     Mental Status:  {IP PT MENTAL STATUS:61608}    IV Access:  - None    Nursing Mobility/ADLs:  Walking   Assisted  Transfer  {[facility] DME GCKN:603857393}  Bathing  {[facility] DME ZFTM:868291540}  Dressing  Assisted  Toileting  Assisted  Feeding  Assisted  Med Admin  Assisted  Med Delivery   whole    Wound Care Documentation and Therapy:        Elimination:  Continence: Bowel: Yes  Bladder: Yes  Urinary Catheter: None   Colostomy/Ileostomy/Ileal Conduit: No       Date of Last BM: 7/27/22    Intake/Output Summary (Last 24 hours) at 7/27/2022 1334  Last data filed at 7/27/2022 0415  Gross per 24 hour   Intake --   Output 800 ml   Net -800 ml     I/O last 3 completed shifts:  In: -   Out: 800 [Urine:800]    Safety Concerns:     [address] [facility] Safety Concerns:[id number]}    Impairments/Disabilities:      None    Nutrition Therapy:  Current Nutrition Therapy:   - Oral Diet:  General    Routes of Feeding: {[facility] Other Feedings:045772310}  Liquids: Thin Liquids  Daily Fluid Restriction: no  Last Modified Barium Swallow with Video (Video Swallowing Test): not done    Treatments at the Time of Hospital Discharge:   Respiratory Treatments: na  Oxygen Therapy:  is not on home oxygen therapy.   Ventilator:    - No ventilator support    Rehab Therapies: Physical Therapy and Occupational Therapy  Weight Bearing Status/Restrictions: No weight bearing restrictions  Other Medical Equipment (for information only, NOT a DME order):  walker  Other Treatments: na    Patient's personal belongings (please select all that are sent with patient):  None    RN SIGNATURE:  Electronically signed by Clover Agosto RN on 7/27/22 at 3:19 PM EDT    CASE MANAGEMENT/SOCIAL WORK SECTION    Inpatient Status Date: ***    Readmission Risk Assessment Score:  Readmission Risk              Risk of Unplanned Readmission:  [id number]           Discharging to Facility/ [facility]   Skilled Nursing   [address] [facility] [facility]         / signature: Electronically signed by Joseph Daley RN on 7/27/22 at 2:13 PM EDT    PHYSICIAN SECTION    Prognosis: Fair    Condition at Discharge: Stable    Rehab Potential (if transferring to Rehab): Good    Recommended Labs or Other Treatments After Discharge:  f/p pcp, covid isolation till 7/31 , encourage po intake     Physician Certification: I certify the above information and transfer of Julee Mobley  is necessary for the continuing treatment of the diagnosis listed and that she requires Home Care for less 30 days.      Update Admission H&P: No change in H&P    PHYSICIAN SIGNATURE:  Electronically signed by Alexander Edgar MD on 7/27/22 at 1:36 PM EDT

## 2022-07-28 ENCOUNTER — CARE COORDINATION (OUTPATIENT)
Dept: CASE MANAGEMENT | Age: 82
End: 2022-07-28

## 2022-07-28 NOTE — CARE COORDINATION
[facility] Transitions Initial Follow Up Call    Call within 2 business days of discharge: Yes    Patient: Thersia Essex Patient : 1940   MRN: [id number]  Reason for Admission: [place]  Discharge Date: 22 RARS: Readmission Risk Score: 11.6      Last Discharge [address] [address]       Date Complaint Diagnosis Description Type Department Provider    22 Shortness of Breath COVID-19 . .. ED to Hosp-Admission (Discharged) (ADMITTED) Curley Sacks, MD; Yaima Escobar. .. Spoke with: DEVON    Facility: [facility]    Attempted to reach patient via phone for initial post hospital transition call. VM left stating purpose of call along with my contact information requesting a return call. Spoke with Veronica at [facility] who states referral is received for [facility] [place], [facility].. SOC today. Gabriela Andrea  [facility]  Care Transitions  [phone]    Care Transitions 24 Hour Call    Care Transitions Interventions         Follow Up  No future appointments.     Gabriela Andrea LPN

## 2022-07-29 ENCOUNTER — CARE COORDINATION (OUTPATIENT)
Dept: CASE MANAGEMENT | Age: 82
End: 2022-07-29

## 2022-07-29 NOTE — CARE COORDINATION
[facility] Transitions Initial Follow Up Call    Call within 2 business days of discharge: Yes    Patient: Lettie Nageotte Patient : 1940   MRN: [id number]  Reason for Admission: [place]  Discharge Date: 22 RARS: Readmission Risk Score: 11.6      Last Discharge [facility]       Date Complaint Diagnosis Description Type Department Provider    22 Shortness of Breath COVID-19 . .. ED to Hosp-Admission (Discharged) (ADMITTED) Parish Cancino MD; Yaima Escobar. .. Spoke with: DEVON    Facility: [facility]    Second and final attempt to reach patient via phone for initial post hospital transition call. VM left stating purpose of call along with my contact information requesting a return call. Episode ended d/t unsuccessful contact. Jessica Joyce LPN [address] [place] [address]  Care Transitions  [phone]    Care Transitions 24 Hour Call    Care Transitions Interventions         Follow Up  No future appointments.     Jessica Joyce LPN

## 2022-12-30 ENCOUNTER — HOSPITAL ENCOUNTER (EMERGENCY)
Age: 82
Discharge: HOME | End: 2022-12-30
Payer: MEDICARE

## 2022-12-30 ENCOUNTER — APPOINTMENT (OUTPATIENT)
Dept: GENERAL RADIOLOGY | Age: 82
End: 2022-12-30
Payer: MEDICARE

## 2022-12-30 VITALS
WEIGHT: 160 LBS | OXYGEN SATURATION: 99 % | SYSTOLIC BLOOD PRESSURE: 141 MMHG | BODY MASS INDEX: 25.71 KG/M2 | RESPIRATION RATE: 18 BRPM | DIASTOLIC BLOOD PRESSURE: 61 MMHG | TEMPERATURE: 97 F | HEIGHT: 66 IN | HEART RATE: 72 BPM

## 2022-12-30 DIAGNOSIS — R19.5 CHANGE IN STOOL: Primary | ICD-10-CM

## 2022-12-30 DIAGNOSIS — Z71.1 WORRIED WELL: ICD-10-CM

## 2022-12-30 LAB
A/G RATIO: 1.6 (ref 1.1–2.2)
ALBUMIN SERPL-MCNC: 4.5 G/DL (ref 3.4–5)
ALP BLD-CCNC: 117 U/L (ref 40–129)
ALT SERPL-CCNC: 23 U/L (ref 10–40)
AMORPHOUS: ABNORMAL /HPF
ANION GAP SERPL CALCULATED.3IONS-SCNC: 9 MMOL/L (ref 3–16)
APTT: 31.9 SEC (ref 23–34.3)
AST SERPL-CCNC: 30 U/L (ref 15–37)
BACTERIA: ABNORMAL /HPF
BASOPHILS ABSOLUTE: 0.1 K/UL (ref 0–0.2)
BASOPHILS RELATIVE PERCENT: 0.8 %
BILIRUB SERPL-MCNC: 0.6 MG/DL (ref 0–1)
BILIRUBIN URINE: NEGATIVE
BLOOD, URINE: ABNORMAL
BUN BLDV-MCNC: 20 MG/DL (ref 7–20)
CALCIUM SERPL-MCNC: 9.8 MG/DL (ref 8.3–10.6)
CHLORIDE BLD-SCNC: 106 MMOL/L (ref 99–110)
CLARITY: CLEAR
CO2: 26 MMOL/L (ref 21–32)
COLOR: YELLOW
CREAT SERPL-MCNC: 0.6 MG/DL (ref 0.6–1.2)
EOSINOPHILS ABSOLUTE: 0.1 K/UL (ref 0–0.6)
EOSINOPHILS RELATIVE PERCENT: 1.5 %
EPITHELIAL CELLS, UA: ABNORMAL /HPF (ref 0–5)
GFR SERPL CREATININE-BSD FRML MDRD: >60 ML/MIN/{1.73_M2}
GLUCOSE BLD-MCNC: 109 MG/DL (ref 70–99)
GLUCOSE URINE: NEGATIVE MG/DL
HCT VFR BLD CALC: 42.7 % (ref 36–48)
HEMOGLOBIN: 14.4 G/DL (ref 12–16)
INR BLD: 1.08 (ref 0.87–1.14)
KETONES, URINE: NEGATIVE MG/DL
LEUKOCYTE ESTERASE, URINE: NEGATIVE
LYMPHOCYTES ABSOLUTE: 2.1 K/UL (ref 1–5.1)
LYMPHOCYTES RELATIVE PERCENT: 22.8 %
MCH RBC QN AUTO: 31 PG (ref 26–34)
MCHC RBC AUTO-ENTMCNC: 33.8 G/DL (ref 31–36)
MCV RBC AUTO: 91.7 FL (ref 80–100)
MICROSCOPIC EXAMINATION: YES
MONOCYTES ABSOLUTE: 0.9 K/UL (ref 0–1.3)
MONOCYTES RELATIVE PERCENT: 10 %
NEUTROPHILS ABSOLUTE: 6.1 K/UL (ref 1.7–7.7)
NEUTROPHILS RELATIVE PERCENT: 64.9 %
NITRITE, URINE: NEGATIVE
OCCULT BLOOD SCREENING: NORMAL
PDW BLD-RTO: 13.5 % (ref 12.4–15.4)
PH UA: 7 (ref 5–8)
PLATELET # BLD: 253 K/UL (ref 135–450)
PMV BLD AUTO: 8.4 FL (ref 5–10.5)
POTASSIUM REFLEX MAGNESIUM: 4.4 MMOL/L (ref 3.5–5.1)
PROTEIN UA: NEGATIVE MG/DL
PROTHROMBIN TIME: 14 SEC (ref 11.7–14.5)
RBC # BLD: 4.65 M/UL (ref 4–5.2)
RBC UA: ABNORMAL /HPF (ref 0–4)
SODIUM BLD-SCNC: 141 MMOL/L (ref 136–145)
SPECIFIC GRAVITY UA: 1.01 (ref 1–1.03)
SPECIMEN STATUS: NORMAL
TOTAL PROTEIN: 7.3 G/DL (ref 6.4–8.2)
TROPONIN: <0.01 NG/ML
URINE REFLEX TO CULTURE: ABNORMAL
URINE TYPE: ABNORMAL
UROBILINOGEN, URINE: 0.2 E.U./DL
WBC # BLD: 9.4 K/UL (ref 4–11)
WBC UA: ABNORMAL /HPF (ref 0–5)

## 2022-12-30 PROCEDURE — 71045 X-RAY EXAM CHEST 1 VIEW: CPT

## 2022-12-30 PROCEDURE — 2580000003 HC RX 258: Performed by: PHYSICIAN ASSISTANT

## 2022-12-30 PROCEDURE — 84484 ASSAY OF TROPONIN QUANT: CPT

## 2022-12-30 PROCEDURE — 82270 OCCULT BLOOD FECES: CPT

## 2022-12-30 PROCEDURE — 93005 ELECTROCARDIOGRAM TRACING: CPT | Performed by: PHYSICIAN ASSISTANT

## 2022-12-30 PROCEDURE — 99285 EMERGENCY DEPT VISIT HI MDM: CPT

## 2022-12-30 PROCEDURE — 81001 URINALYSIS AUTO W/SCOPE: CPT

## 2022-12-30 PROCEDURE — 85025 COMPLETE CBC W/AUTO DIFF WBC: CPT

## 2022-12-30 PROCEDURE — 80053 COMPREHEN METABOLIC PANEL: CPT

## 2022-12-30 PROCEDURE — 85730 THROMBOPLASTIN TIME PARTIAL: CPT

## 2022-12-30 PROCEDURE — 85610 PROTHROMBIN TIME: CPT

## 2022-12-30 RX ORDER — 0.9 % SODIUM CHLORIDE 0.9 %
500 INTRAVENOUS SOLUTION INTRAVENOUS ONCE
Status: COMPLETED | OUTPATIENT
Start: 2022-12-30 | End: 2022-12-30

## 2022-12-30 RX ADMIN — SODIUM CHLORIDE 500 ML: 9 INJECTION, SOLUTION INTRAVENOUS at 18:08

## 2022-12-30 ASSESSMENT — PAIN - FUNCTIONAL ASSESSMENT
PAIN_FUNCTIONAL_ASSESSMENT: NONE - DENIES PAIN
PAIN_FUNCTIONAL_ASSESSMENT: NONE - DENIES PAIN

## 2022-12-30 NOTE — DISCHARGE INSTRUCTIONS
Please schedule an appointment to follow-up with Che Foreman, she is a GI doctor.    Please return for new or worse symptoms

## 2022-12-31 LAB
EKG ATRIAL RATE: 60 BPM
EKG DIAGNOSIS: NORMAL
EKG P AXIS: 68 DEGREES
EKG P-R INTERVAL: 140 MS
EKG Q-T INTERVAL: 418 MS
EKG QRS DURATION: 80 MS
EKG QTC CALCULATION (BAZETT): 418 MS
EKG R AXIS: -32 DEGREES
EKG T AXIS: 31 DEGREES
EKG VENTRICULAR RATE: 60 BPM

## 2022-12-31 PROCEDURE — 93010 ELECTROCARDIOGRAM REPORT: CPT | Performed by: INTERNAL MEDICINE

## 2023-01-01 NOTE — ED PROVIDER NOTES
[facility]  ED  EMERGENCY DEPARTMENT ENCOUNTER        Pt Name: Maisha Hahn  MRN: [id number]  [place] 1940  Date of evaluation: 12/30/2022  Provider: Iris Dejesus PA-C  PCP: Demi Dudley MD        This patient was not seen and evaluated by the attending physician No att. providers found. I have independently evaluated this patient. CHIEF COMPLAINT       Chief Complaint   Patient presents with    Other     Per  pt has been having bleeding and he noticed blood in the toilet yesterday but pt said it was from her nose. Pt hx of alzheimer's and is a poor historian.  says he has not noticed her nose bleeding.  is primary caregiver. HISTORY OF PRESENT ILLNESS   (Location/Symptom, Timing/Onset, Context/Setting, Quality, Duration, Modifying Factors, Severity)  Note limiting factors. Maisha Hahn is a 79 y.o. female with a past medical history of CAD, asthma, hypothyroid, Alzheimer's disease presents to the ED for evaluation of a concern for rectal bleeding. Patient's  advised that he found bright red blood in the toilet this morning he is concerned came from patient's rectum. Patient advised that she is healthy as a horse and has no complaints at this time she denies any blood in her stool or urine. Nursing Notes were all reviewed and agreed with or any disagreements were addressed  in the HPI. REVIEW OF SYSTEMS  (2-9 systems for level 4, 10 or more for level 5)     Review of Systems   Constitutional:  Negative for chills and fever. HENT: Negative. Negative for congestion, rhinorrhea, sinus pressure, sinus pain and sore throat. Eyes:  Negative for discharge, redness and visual disturbance. Respiratory:  Negative for cough, chest tightness and shortness of breath. Cardiovascular:  Negative for chest pain and palpitations. Gastrointestinal:  Negative for abdominal pain, constipation, diarrhea, nausea and vomiting. Genitourinary:  Negative for difficulty urinating, dysuria and frequency. Musculoskeletal: Negative. Skin: Negative. Neurological: Negative. Negative for dizziness, weakness, numbness and headaches. Psychiatric/Behavioral: Negative. All other systems reviewed and are negative. Positivesand Pertinent negatives as per HPI. Except as noted above in the ROS, all other systems were reviewed and negative. PAST MEDICAL HISTORY     Past Medical History:   Diagnosis Date    Cerebrovascular disease     squamous cell carcinoma    Diabetes mellitus ([facility] [address]     Thyroid disease          SURGICAL HISTORY       Past Surgical History:   Procedure Laterality Date    APPENDECTOMY      CHOLECYSTECTOMY      MOHS SURGERY  04/01/2019         CURRENT MEDICATIONS       Discharge Medication List as of 12/30/2022  7:28 PM        CONTINUE these medications which have NOT CHANGED    Details   calcium carbonate (OSCAL) 500 MG TABS tablet Take 1,000 mg by mouth in the morning. Historical Med      aspirin 81 MG chewable tablet Take 81 mg by mouth in the morning. Historical Med      memantine (NAMENDA) 10 MG tablet Take 10 mg by mouth in the morning and 10 mg before bedtime. Historical Med      OLANZapine (ZYPREXA) 2.5 MG tablet Take 2.5 mg by mouth nightlyHistorical Med      rosuvastatin (CRESTOR) 20 MG tablet Take 20 mg by mouth in the morning. Historical Med      sertraline (ZOLOFT) 25 MG tablet Take 25 mg by mouth in the morning. Historical Med      Cholecalciferol (VITAMIN D3) 125 MCG (5000 UT) TABS Take 1 tablet by mouthHistorical Med      montelukast (SINGULAIR) 10 MG tablet TAKE 1 TABLET BY MOUTH EVERY DAY, R-1Historical Med      alendronate (FOSAMAX) 35 MG tablet TAKE 1 TAB BY MOUTH EVERY 7 DAYS (ONCE WEEKLY). , R-5Historical Med      levothyroxine (SYNTHROID) 25 MCG tablet Take 50 mcg by mouth in the morning., R-4Historical Med               ALLERGIES     Egg white [egg white (egg protein)]    FAMILY HISTORY     History reviewed. No pertinent family history. SOCIAL HISTORY       Social History     Socioeconomic History    Marital status:      Spouse name: None    Number of children: None    Years of education: None    Highest education level: None   Tobacco Use    Smoking status: Never    Smokeless tobacco: Never   Vaping Use    Vaping Use: Never used   Substance and Sexual Activity    Alcohol use: Yes     Alcohol/week: 1.0 standard drink     Types: 1 Glasses of wine per week     Comment: nightly    Drug use: Never       SCREENINGS     NIH Score       Glascow Ángel Coma Scale  Eye Opening: Spontaneous  Best Verbal Response: Confused  Best Motor Response: Obeys commands  Gibson Coma Scale Score: 14    Glascow Peds     Heart Score         PHYSICAL EXAM    (up to 7 for level 4, 8 ormore for level 5)     ED Triage Vitals [12/30/22 1704]   BP Temp Temp Source Heart Rate Resp SpO2 Height Weight   (!) 146/70 97 °F (36.1 °C) Oral 92 18 98 % 5' 6\" (1.676 m) 160 lb (72.6 kg)       Physical Exam  Vitals and nursing note reviewed. Exam conducted with a chaperone present. Constitutional:       Appearance: Normal appearance. She is well-developed. She is not diaphoretic. Comments: Changes consistent with age   HENT:      Head: Normocephalic and atraumatic. Nose: Nose normal.      Mouth/Throat:      Mouth: Mucous membranes are moist.      Pharynx: No oropharyngeal exudate. Eyes:      General:         Right eye: No discharge. Left eye: No discharge. Extraocular Movements: Extraocular movements intact. Conjunctiva/sclera: Conjunctivae normal.      Pupils: Pupils are equal, round, and reactive to light. Cardiovascular:      Rate and Rhythm: Normal rate and regular rhythm. Heart sounds: Normal heart sounds. No murmur heard. No friction rub. No gallop. Pulmonary:      Effort: Pulmonary effort is normal. No respiratory distress. Breath sounds: Normal breath sounds. No wheezing. Abdominal:      General: Bowel sounds are normal. There is no distension. Palpations: Abdomen is soft. Tenderness: There is no abdominal tenderness. Genitourinary:     Rectum: Normal. Guaiac result negative. Musculoskeletal:         General: Normal range of motion. Cervical back: Normal range of motion. Skin:     General: Skin is warm and dry. Capillary Refill: Capillary refill takes less than 2 seconds. Neurological:      General: No focal deficit present. Mental Status: She is alert. Psychiatric:         Mood and Affect: Mood normal.         Behavior: Behavior normal.       DIAGNOSTIC RESULTS   LABS:    Labs Reviewed   COMPREHENSIVE METABOLIC PANEL W/ REFLEX TO MG FOR LOW K - Abnormal; Notable for the following components:       Result Value    Glucose 109 (*)     All other components within normal limits   URINALYSIS WITH REFLEX TO CULTURE - Abnormal; Notable for the following components:    Blood, Urine SMALL (*)     All other components within normal limits   MICROSCOPIC URINALYSIS - Abnormal; Notable for the following components:    RBC, UA 5-10 (*)     Bacteria, UA Rare (*)     All other components within normal limits   CBC WITH AUTO DIFFERENTIAL   BLOOD OCCULT STOOL SCREEN #1    Narrative:     ORDER#: [id number]                          ORDERED BY: RODOLFO BUSH  SOURCE: Stool                              COLLECTED:  12/30/22 17:32  ANTIBIOTICS AT PARISA.:                      RECEIVED :  12/30/22 17:54   SAMPLE POSSIBLE BLOOD BANK TESTING   TROPONIN   PROTIME-INR   APTT       All other labs were within normal range or notreturned as of this dictation. EKG: All EKG's are interpreted by the Emergency Department Physician who either signs or Co-signs this chart in the absence of a cardiologist.  Please see their note for interpretation of EKG.       RADIOLOGY:         Interpretation per the Radiologist below, if available at the time of this note:    XR CHEST PORTABLE Final Result   Linear opacities at the left lung base favored to represent atelectasis or   scarring. Elsewhere, there are no acute findings. COPD. XR CHEST PORTABLE    Result Date: 12/30/2022  EXAMINATION: ONE XRAY VIEW OF THE CHEST 12/30/2022 4:44 pm COMPARISON: 07/25/2022 and 05/10/2020. HISTORY: ORDERING SYSTEM PROVIDED HISTORY: other TECHNOLOGIST PROVIDED HISTORY: Reason for exam:->other Reason for Exam: bleeding FINDINGS: There are linear left basilar opacities. The lungs and costophrenic angles are otherwise clear. Lung volumes are large the diaphragm is flattened. The cardiomediastinal silhouette and pulmonary vessels appear normal.     Linear opacities at the left lung base favored to represent atelectasis or scarring. Elsewhere, there are no acute findings. COPD. EMERGENCY DEPARTMENT COURSE and DIFFERENTIAL DIAGNOSIS/MDM:   Vitals:    Vitals:    12/30/22 1704 12/30/22 1819 12/30/22 1915   BP: (!) 146/70 (!) 141/61    Pulse: 92 72    Resp: 18 18    Temp: 97 °F (36.1 °C)     TempSrc: Oral     SpO2: 98% 96% 99%   Weight: 160 lb (72.6 kg)     Height: 5' 6\" (1.676 m)         Patient was given the following medications:  Medications   0.9 % sodium chloride bolus (0 mLs IntraVENous Stopped 12/30/22 1854)         Afebrile, stable, patient presents to the ED for evaluation. Nontoxic patient in no acute distress SPO2 on room air of 99% the patient's not hypoxic. Patient with no complaints however patient's  is concerned about possible rectal bleeding. She is not taking any blood thinners. Patient has no complaints. Rectal examination is performed with a negative occult stool. Patient's labs indicate no evidence of a acute anemia. Reassured the patient and her . I see no indication for inpatient treatment at this time. All questions are answered. Indications for return to the ED are discussed.   Patient is advised if any new or worsening symptoms arise they should immediately return to the emergency room. Follow-up with primary care in 1-2 days. The patient tolerated their visit well. The patient and / or the family were informed of the results of any tests, a time was given to answer questions, a plan was proposed and they agreed Nabila Will.     Results for orders placed or performed during the hospital encounter of 12/30/22   CBC with Auto Differential   Result Value Ref Range    WBC 9.4 4.0 - 11.0 K/uL    RBC 4.65 4.00 - 5.20 M/uL    Hemoglobin 14.4 12.0 - 16.0 g/dL    Hematocrit 42.7 36.0 - 48.0 %    MCV 91.7 80.0 - 100.0 fL    MCH 31.0 26.0 - 34.0 pg    MCHC 33.8 31.0 - 36.0 g/dL    RDW 13.5 12.4 - 15.4 %    Platelets 478 036 - 257 K/uL    MPV 8.4 5.0 - 10.5 fL    Neutrophils % 64.9 %    Lymphocytes % 22.8 %    Monocytes % 10.0 %    Eosinophils % 1.5 %    Basophils % 0.8 %    Neutrophils Absolute 6.1 1.7 - 7.7 K/uL    Lymphocytes Absolute 2.1 1.0 - 5.1 K/uL    Monocytes Absolute 0.9 0.0 - 1.3 K/uL    Eosinophils Absolute 0.1 0.0 - 0.6 K/uL    Basophils Absolute 0.1 0.0 - 0.2 K/uL   Comprehensive Metabolic Panel w/ Reflex to MG   Result Value Ref Range    Sodium 141 136 - 145 mmol/L    Potassium reflex Magnesium 4.4 3.5 - 5.1 mmol/L    Chloride 106 99 - 110 mmol/L    CO2 26 21 - 32 mmol/L    Anion Gap 9 3 - 16    Glucose 109 (H) 70 - 99 mg/dL    BUN 20 7 - 20 mg/dL    Creatinine 0.6 0.6 - 1.2 mg/dL    Est, Glom Filt Rate >60 >60    Calcium 9.8 8.3 - 10.6 mg/dL    Total Protein 7.3 6.4 - 8.2 g/dL    Albumin 4.5 3.4 - 5.0 g/dL    Albumin/Globulin Ratio 1.6 1.1 - 2.2    Total Bilirubin 0.6 0.0 - 1.0 mg/dL    Alkaline Phosphatase 117 40 - 129 U/L    ALT 23 10 - 40 U/L    AST 30 15 - 37 U/L   Blood Occult Stool Screen #1   Result Value Ref Range    Occult Blood Screening Result: Negative  Normal range: Negative      Urinalysis with Reflex to Culture    Specimen: Urine   Result Value Ref Range    Color, UA Yellow Straw/Yellow    Clarity, UA Clear Clear    Glucose, Ur Negative Negative mg/dL    Bilirubin Urine Negative Negative    Ketones, Urine Negative Negative mg/dL    Specific Gravity, UA 1.015 1.005 - 1.030    Blood, Urine SMALL (A) Negative    pH, UA 7.0 5.0 - 8.0    Protein, UA Negative Negative mg/dL    Urobilinogen, Urine 0.2 <2.0 E.U./dL    Nitrite, Urine Negative Negative    Leukocyte Esterase, Urine Negative Negative    Microscopic Examination YES     Urine Type NotGiven     Urine Reflex to Culture Not Indicated    Sample possible blood bank testing   Result Value Ref Range    Specimen Status PARISA    Troponin   Result Value Ref Range    Troponin <0.01 <0.01 ng/mL   Protime-INR   Result Value Ref Range    Protime 14.0 11.7 - 14.5 sec    INR 1.08 0.87 - 1.14   APTT   Result Value Ref Range    aPTT 31.9 23.0 - 34.3 sec   Microscopic Urinalysis   Result Value Ref Range    WBC, UA 0-2 0 - 5 /HPF    RBC, UA 5-10 (A) 0 - 4 /HPF    Epithelial Cells, UA 2-5 0 - 5 /HPF    Bacteria, UA Rare (A) None Seen /HPF    Amorphous, UA 2+ /HPF   EKG 12 Lead   Result Value Ref Range    Ventricular Rate 60 BPM    Atrial Rate 60 BPM    P-R Interval 140 ms    QRS Duration 80 ms    Q-T Interval 418 ms    QTc Calculation (Bazett) 418 ms    P Axis 68 degrees    R Axis -32 degrees    T Axis 31 degrees    Diagnosis       Normal sinus rhythmLeft axis deviationAbnormal ECGWhen compared with ECG of 26-JUL-2022 11:56,No significant change was foundConfirmed by Saúl Jha ([id number]) on 12/31/2022 7:49:48 AM         I estimate there is LOW risk for ACUTE APPENDICITIS, BOWEL OBSTRUCTION, CHOLECYSTITIS, DIVERTICULITIS, INCARCERATED HERNIA, PANCREATITIS, or PERFORATED BOWEL or ULCER, thus I consider the discharge disposition reasonable. Also, there is no evidence or peritonitis, sepsis, or toxicity. Nivia Crowley and I have discussed the diagnosis and risks, and we agree with discharging home to follow-up with their primary doctor.  We also discussed returning to the Emergency Department immediately if new or worsening symptoms occur. We have discussed the symptoms which are most concerning (e.g., bloody stool, fever, changing or worsening pain, vomiting) that necessitate immediate return. FINAL Impression    1. Change in stool    2. Worried well        Blood pressure (!) 141/61, pulse 72, temperature 97 °F (36.1 °C), temperature source Oral, resp. rate 18, height 5' 6\" (1.676 m), weight 160 lb (72.6 kg), SpO2 99 %. FINAL IMPRESSION      1. Change in stool    2. Worried well          DISPOSITION/PLAN   DISPOSITION Decision To Discharge 12/30/2022 07:24:56 PM      PATIENT REFERRED TO:  Adama Hurtado MD  [phone]  [place] [address].  [address] [place] [id number] [phone]      GI DOCTOR, please schedule an appt to follow up      DISCHARGE MEDICATIONS:  Discharge Medication List as of 12/30/2022  7:28 PM          DISCONTINUED MEDICATIONS:  Discharge Medication List as of 12/30/2022  7:28 PM        STOP taking these medications       omeprazole (PRILOSEC) 20 MG delayed release capsule Comments:   Reason for Stopping:         RESTASIS 0.05 % ophthalmic emulsion Comments:   Reason for Stopping:                      Pt was seen during the [place] 19 pandemic. Appropriate PPE worn by ME during patient encounters. Pt seen during a time with constrained hospital bed capacity and other potential inpatient and outpatient resources were constrained due to the viral pandemic.    Please note that portions of this note were completed with a voice recognition program.  Efforts were made to edit the dictations but occasionally words are mis-transcribed.)    Robbie Briggs PA-C (electronically signed)       Robbie Briggs PA-C  12/31/22 2014

## 2023-07-27 NOTE — ED NOTES
Telephone report w/ floor RN.       Sebastian Montero RN  07/25/22 [id number] Epidermal Autograft Text: The defect edges were debeveled with a #15 scalpel blade.  Given the location of the defect, shape of the defect and the proximity to free margins an epidermal autograft was deemed most appropriate.  Using a sterile surgical marker, the primary defect shape was transferred to the donor site. The epidermal graft was then harvested.  The skin graft was then placed in the primary defect and oriented appropriately.